# Patient Record
Sex: MALE | Race: NATIVE HAWAIIAN OR OTHER PACIFIC ISLANDER | Employment: FULL TIME | URBAN - METROPOLITAN AREA
[De-identification: names, ages, dates, MRNs, and addresses within clinical notes are randomized per-mention and may not be internally consistent; named-entity substitution may affect disease eponyms.]

---

## 2019-12-02 ENCOUNTER — HOSPITAL ENCOUNTER (EMERGENCY)
Facility: CLINIC | Age: 54
Discharge: HOME | End: 2019-12-02
Attending: EMERGENCY MEDICINE | Admitting: EMERGENCY MEDICINE
Payer: OTHER MISCELLANEOUS

## 2019-12-02 VITALS
TEMPERATURE: 96.7 F | DIASTOLIC BLOOD PRESSURE: 102 MMHG | OXYGEN SATURATION: 96 % | HEART RATE: 68 BPM | WEIGHT: 260 LBS | SYSTOLIC BLOOD PRESSURE: 182 MMHG | RESPIRATION RATE: 18 BRPM

## 2019-12-02 DIAGNOSIS — H57.8A9 SENSATION OF FOREIGN BODY IN EYE: ICD-10-CM

## 2019-12-02 PROCEDURE — 99282 EMERGENCY DEPT VISIT SF MDM: CPT | Performed by: EMERGENCY MEDICINE

## 2019-12-02 PROCEDURE — 99282 EMERGENCY DEPT VISIT SF MDM: CPT | Mod: Z6 | Performed by: EMERGENCY MEDICINE

## 2019-12-02 RX ORDER — PROPARACAINE HYDROCHLORIDE 5 MG/ML
1 SOLUTION/ DROPS OPHTHALMIC ONCE
Status: DISCONTINUED | OUTPATIENT
Start: 2019-12-02 | End: 2019-12-02 | Stop reason: HOSPADM

## 2019-12-02 SDOH — HEALTH STABILITY: MENTAL HEALTH: HOW OFTEN DO YOU HAVE A DRINK CONTAINING ALCOHOL?: NEVER

## 2019-12-02 ASSESSMENT — ENCOUNTER SYMPTOMS
EYE PAIN: 1
EYE REDNESS: 1

## 2019-12-02 NOTE — ED AVS SNAPSHOT
[facility], [place], Emergency Department  [address]  [facility] [[email or web address] [address]  Phone:  [phone]  Fax:  [phone]                                    Nico Coello   MRN: [id number]    Department:  [facility], Emergency Department   Date of Visit:  12/2/2019           After Visit Summary Signature Page    I have received my discharge instructions, and my questions have been answered. I have discussed any challenges I see with this plan with the nurse or doctor.    ..........................................................................................................................................  Patient/Patient Representative Signature      ..........................................................................................................................................  Patient Representative Print Name and Relationship to Patient    ..................................................               ................................................  Date                                   Time    ..........................................................................................................................................  Reviewed by Signature/Title    ...................................................              ..............................................  Date                                               Time          22EPIC Rev 08/18

## 2019-12-02 NOTE — ED PROVIDER NOTES
"    [facility] EMERGENCY DEPARTMENT ([facility])    12/02/19        History     Chief Complaint   Patient presents with     Eye Problem     Onset last night at work, \"I was cleaning and something got in my eye and it still hurts today,\" pain and redness in right eye.     The history is provided by the patient and medical records.   Eye Problem   Associated symptoms: redness      Nico Coello is a 53 year old male with no past significant medical history who presents to the Emergency Department for evaluation of foreign body in right eye. 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 Initially, patient believed that it was this may be a dust particle so he proceeded to attempt to wash out his eye per recommendation by his supervisor. He notes that his pain was improved after washing. Today, patient states that he went to work and still feels irritation and a sensation that the object was still present in his eye.     Patient denies knowledge of any medication allergies. He denies a history of diabetes mellitus or hypertension diagnosis but was told by his primary care provider that he is in the border.    I have reviewed the Medications, Allergies, Past Medical and Surgical History, and Social History in the Epic system.    History reviewed. No pertinent past medical history.    History reviewed. No pertinent surgical history.    History reviewed. No pertinent family history.    Social History     Tobacco Use     Smoking status: Never Smoker     Smokeless tobacco: Never Used   Substance Use Topics     Alcohol use: Never     Frequency: Never         Review of Systems   Constitutional: Negative for fever.   Eyes: Positive for pain (right) and redness.   Allergic/Immunologic: Negative for immunocompromised state.   Hematological: Does not bruise/bleed easily. "       Physical Exam   BP: (!) 199/112  Pulse: 68  Heart Rate: 68  Temp: 96.7  F (35.9  C)  Resp: 18  Weight: 117.9 kg (260 lb)  SpO2: 100 %      Physical Exam  Gen:A&Ox3, no acute distress  HEENT: no facial tenderness or wounds, head atraumatic, oropharynx clear, mucous membranes moist, TMs clear bilaterally.  Neck:no bony tenderness or step offs, no JVD, trachea midline  CV:RRR without murmurs  PULM:Clear to auscultation bilaterally  Neuro:CN II-XII intact, strength 5/5 throughout, gait stable.   Skin: no rashes or ecchymoses  Eyes: EOMI. PERRL, pupil shape is round. Right upper lid with mild blepharitis and edema at the lateral canthus. No foreign bodies noted in the eye on lid eversion. Minimal right eye conjunctival injection, tearing and photophobia. Anterior chamber deep and quiet, free of hyphema or hypopion. No fluorescene uptake by the cornea.  Visual fields intact on confrontation.Foreign body sensation improved with proparacaine.         ED Course   4:45 PM  The patient was seen and examined by Savannah Downey MD in Room ED06.        Procedures             Critical Care time:  none             Labs Ordered and Resulted from Time of ED Arrival Up to the Time of Departure from the ED - No data to display         Assessments & Plan (with Medical Decision Making)     52 yo M presenting with right eye discomfort and foreign body sensation. Triggered by getting dust in the right eye. Has irrigated with water prior to arrival.   Visual acuity normal bilaterally.   Exam without signs of infection, iritis, angle closure, or retained foreign bodies. Cornea without abrasion.   Treating supportively with cold compresses, lid cleansing with tear-free shampoo, artificial tears as needed.   Follow up with Ophthalmology if not improving as expected.   Discharged.     I have reviewed the nursing notes.    I have reviewed the findings, diagnosis, plan and need for follow up with the patient.    There are no discharge  medications for this patient.      Final diagnoses:   Sensation of foreign body in eye     I, Tani Patterson, am serving as a trained medical scribe to document services personally performed by Savannah Downey MD, based on the provider's statements to me.      ISavannah MD, was physically present and have reviewed and verified the accuracy of this note documented by Tani Patterson.     12/2/2019   [facility], [place], EMERGENCY DEPARTMENT    MD ASHLEY Busby Katrina Anne, MD  12/04/19 0358

## 2019-12-02 NOTE — DISCHARGE INSTRUCTIONS
Thank you for coming to the [facility] Emergency Department.     Please follow up with your primary care doctor in the next week for a recheck of your blood pressure. It was high today, likely due to anxiety or pain, but needs to be rechecked.     Home care instructions for your right eye:  Please buy over-the-counter 'tear-free' baby shampoo. Form a foam with this shampoo and water, then use it to clean the eyelid edges while your eye is closed. Rinse the lid with water. Do not put water or soap in your eye. Wash the eye lids once daily for the next week.   Please buy preservative-free artificial tears drops at the pharmacy. Apply 1-2 drops in the irritated eye every 1-2 hours.   Apply a cool compress to the right eye as needed for swelling or lid discomfort.    Please make an appointment to follow up with Eye Clinic (phone: [phone]) in 5-7 days unless symptoms completely resolve.

## 2019-12-04 ASSESSMENT — ENCOUNTER SYMPTOMS
BRUISES/BLEEDS EASILY: 0
FEVER: 0

## 2021-03-24 ENCOUNTER — HOSPITAL ENCOUNTER (INPATIENT)
Facility: CLINIC | Age: 56
LOS: 4 days | Discharge: HOME | End: 2021-03-29
Attending: EMERGENCY MEDICINE | Admitting: SURGERY
Payer: COMMERCIAL

## 2021-03-24 ENCOUNTER — APPOINTMENT (OUTPATIENT)
Dept: GENERAL RADIOLOGY | Facility: CLINIC | Age: 56
End: 2021-03-24
Attending: EMERGENCY MEDICINE
Payer: COMMERCIAL

## 2021-03-24 ENCOUNTER — ANCILLARY PROCEDURE (OUTPATIENT)
Dept: ULTRASOUND IMAGING | Facility: CLINIC | Age: 56
End: 2021-03-24
Attending: EMERGENCY MEDICINE
Payer: COMMERCIAL

## 2021-03-24 ENCOUNTER — APPOINTMENT (OUTPATIENT)
Dept: CT IMAGING | Facility: CLINIC | Age: 56
End: 2021-03-24
Attending: EMERGENCY MEDICINE
Payer: COMMERCIAL

## 2021-03-24 DIAGNOSIS — Z11.52 ENCOUNTER FOR SCREENING LABORATORY TESTING FOR SEVERE ACUTE RESPIRATORY SYNDROME CORONAVIRUS 2 (SARS-COV-2): ICD-10-CM

## 2021-03-24 DIAGNOSIS — K81.0 ACUTE CHOLECYSTITIS: ICD-10-CM

## 2021-03-24 LAB
ALBUMIN SERPL-MCNC: 3.4 G/DL (ref 3.4–5)
ALP SERPL-CCNC: 119 U/L (ref 40–150)
ALT SERPL W P-5'-P-CCNC: 27 U/L (ref 0–70)
ANION GAP SERPL CALCULATED.3IONS-SCNC: 8 MMOL/L (ref 3–14)
AST SERPL W P-5'-P-CCNC: 36 U/L (ref 0–45)
BASE EXCESS BLDV CALC-SCNC: 0.1 MMOL/L
BASOPHILS # BLD AUTO: 0.1 10E9/L (ref 0–0.2)
BASOPHILS NFR BLD AUTO: 0.2 %
BILIRUB SERPL-MCNC: 1.3 MG/DL (ref 0.2–1.3)
BUN SERPL-MCNC: 30 MG/DL (ref 7–30)
CALCIUM SERPL-MCNC: 9.1 MG/DL (ref 8.5–10.1)
CHLORIDE SERPL-SCNC: 103 MMOL/L (ref 94–109)
CO2 SERPL-SCNC: 25 MMOL/L (ref 20–32)
CREAT SERPL-MCNC: 2.31 MG/DL (ref 0.66–1.25)
DIFFERENTIAL METHOD BLD: ABNORMAL
EOSINOPHIL # BLD AUTO: 0 10E9/L (ref 0–0.7)
EOSINOPHIL NFR BLD AUTO: 0.1 %
ERYTHROCYTE [DISTWIDTH] IN BLOOD BY AUTOMATED COUNT: 17.6 % (ref 10–15)
GFR SERPL CREATININE-BSD FRML MDRD: 30 ML/MIN/{1.73_M2}
GLUCOSE SERPL-MCNC: 105 MG/DL (ref 70–99)
HCO3 BLDV-SCNC: 25 MMOL/L (ref 21–28)
HCT VFR BLD AUTO: 34.4 % (ref 40–53)
HGB BLD-MCNC: 10.8 G/DL (ref 13.3–17.7)
IMM GRANULOCYTES # BLD: 0.2 10E9/L (ref 0–0.4)
IMM GRANULOCYTES NFR BLD: 0.7 %
LACTATE BLD-SCNC: 1.9 MMOL/L (ref 0.7–2)
LYMPHOCYTES # BLD AUTO: 0.7 10E9/L (ref 0.8–5.3)
LYMPHOCYTES NFR BLD AUTO: 2.4 %
MCH RBC QN AUTO: 26.7 PG (ref 26.5–33)
MCHC RBC AUTO-ENTMCNC: 31.4 G/DL (ref 31.5–36.5)
MCV RBC AUTO: 85 FL (ref 78–100)
MONOCYTES # BLD AUTO: 0.8 10E9/L (ref 0–1.3)
MONOCYTES NFR BLD AUTO: 2.9 %
NEUTROPHILS # BLD AUTO: 25.1 10E9/L (ref 1.6–8.3)
NEUTROPHILS NFR BLD AUTO: 93.7 %
NRBC # BLD AUTO: 0 10*3/UL
NRBC BLD AUTO-RTO: 0 /100
O2/TOTAL GAS SETTING VFR VENT: 21 %
OXYHGB MFR BLDV: 67 %
PCO2 BLDV: 41 MM HG (ref 40–50)
PH BLDV: 7.4 PH (ref 7.32–7.43)
PLATELET # BLD AUTO: 223 10E9/L (ref 150–450)
PO2 BLDV: 38 MM HG (ref 25–47)
POTASSIUM SERPL-SCNC: 3.7 MMOL/L (ref 3.4–5.3)
PROT SERPL-MCNC: 8.5 G/DL (ref 6.8–8.8)
RBC # BLD AUTO: 4.05 10E12/L (ref 4.4–5.9)
SODIUM SERPL-SCNC: 136 MMOL/L (ref 133–144)
WBC # BLD AUTO: 26.8 10E9/L (ref 4–11)

## 2021-03-24 PROCEDURE — 71046 X-RAY EXAM CHEST 2 VIEWS: CPT | Mod: 26 | Performed by: RADIOLOGY

## 2021-03-24 PROCEDURE — 87636 SARSCOV2 & INF A&B AMP PRB: CPT | Performed by: EMERGENCY MEDICINE

## 2021-03-24 PROCEDURE — 76705 ECHO EXAM OF ABDOMEN: CPT | Mod: 26 | Performed by: EMERGENCY MEDICINE

## 2021-03-24 PROCEDURE — 71046 X-RAY EXAM CHEST 2 VIEWS: CPT

## 2021-03-24 PROCEDURE — 74176 CT ABD & PELVIS W/O CONTRAST: CPT | Mod: 26 | Performed by: RADIOLOGY

## 2021-03-24 PROCEDURE — 74176 CT ABD & PELVIS W/O CONTRAST: CPT

## 2021-03-24 PROCEDURE — 99285 EMERGENCY DEPT VISIT HI MDM: CPT | Mod: 25 | Performed by: EMERGENCY MEDICINE

## 2021-03-24 PROCEDURE — 93005 ELECTROCARDIOGRAM TRACING: CPT | Mod: 59 | Performed by: EMERGENCY MEDICINE

## 2021-03-24 PROCEDURE — 258N000003 HC RX IP 258 OP 636: Performed by: EMERGENCY MEDICINE

## 2021-03-24 PROCEDURE — 76705 ECHO EXAM OF ABDOMEN: CPT | Performed by: EMERGENCY MEDICINE

## 2021-03-24 PROCEDURE — 96366 THER/PROPH/DIAG IV INF ADDON: CPT | Performed by: EMERGENCY MEDICINE

## 2021-03-24 PROCEDURE — 87077 CULTURE AEROBIC IDENTIFY: CPT | Performed by: EMERGENCY MEDICINE

## 2021-03-24 PROCEDURE — 85025 COMPLETE CBC W/AUTO DIFF WBC: CPT | Performed by: EMERGENCY MEDICINE

## 2021-03-24 PROCEDURE — 82805 BLOOD GASES W/O2 SATURATION: CPT | Performed by: EMERGENCY MEDICINE

## 2021-03-24 PROCEDURE — 87800 DETECT AGNT MULT DNA DIREC: CPT | Performed by: EMERGENCY MEDICINE

## 2021-03-24 PROCEDURE — 83605 ASSAY OF LACTIC ACID: CPT | Performed by: EMERGENCY MEDICINE

## 2021-03-24 PROCEDURE — 250N000011 HC RX IP 250 OP 636: Performed by: EMERGENCY MEDICINE

## 2021-03-24 PROCEDURE — 87040 BLOOD CULTURE FOR BACTERIA: CPT | Performed by: EMERGENCY MEDICINE

## 2021-03-24 PROCEDURE — 96361 HYDRATE IV INFUSION ADD-ON: CPT | Performed by: EMERGENCY MEDICINE

## 2021-03-24 PROCEDURE — 96365 THER/PROPH/DIAG IV INF INIT: CPT | Performed by: EMERGENCY MEDICINE

## 2021-03-24 PROCEDURE — 93010 ELECTROCARDIOGRAM REPORT: CPT | Mod: 59 | Performed by: EMERGENCY MEDICINE

## 2021-03-24 PROCEDURE — 87186 SC STD MICRODIL/AGAR DIL: CPT | Performed by: EMERGENCY MEDICINE

## 2021-03-24 PROCEDURE — C9803 HOPD COVID-19 SPEC COLLECT: HCPCS | Performed by: EMERGENCY MEDICINE

## 2021-03-24 PROCEDURE — 80053 COMPREHEN METABOLIC PANEL: CPT | Performed by: EMERGENCY MEDICINE

## 2021-03-24 RX ORDER — AMLODIPINE AND BENAZEPRIL HYDROCHLORIDE 10; 40 MG/1; MG/1
1 CAPSULE ORAL DAILY
COMMUNITY

## 2021-03-24 RX ORDER — PIPERACILLIN SODIUM, TAZOBACTAM SODIUM 3; .375 G/15ML; G/15ML
3.38 INJECTION, POWDER, LYOPHILIZED, FOR SOLUTION INTRAVENOUS ONCE
Status: COMPLETED | OUTPATIENT
Start: 2021-03-24 | End: 2021-03-25

## 2021-03-24 RX ORDER — ATORVASTATIN CALCIUM 40 MG/1
40 TABLET, FILM COATED ORAL DAILY
COMMUNITY

## 2021-03-24 RX ADMIN — SODIUM CHLORIDE 1000 ML: 9 INJECTION, SOLUTION INTRAVENOUS at 22:54

## 2021-03-24 RX ADMIN — PIPERACILLIN SODIUM AND TAZOBACTAM SODIUM 3.38 G: 3; .375 INJECTION, POWDER, LYOPHILIZED, FOR SOLUTION INTRAVENOUS at 23:42

## 2021-03-24 RX ADMIN — SODIUM CHLORIDE 1000 ML: 9 INJECTION, SOLUTION INTRAVENOUS at 21:46

## 2021-03-24 ASSESSMENT — ENCOUNTER SYMPTOMS
DIZZINESS: 1
CHILLS: 1
FEVER: 1
BACK PAIN: 1
DYSURIA: 0
COUGH: 0
ABDOMINAL PAIN: 0
SORE THROAT: 0

## 2021-03-24 NOTE — LETTER
[facility] [address] [address]  Phone: [phone]    March 29, 2021        Nico Coello  [address] [place] [address] [address]  [place] [address]          To whom it may concern:    RE: Nico ARIZMENDI Mode    Mr Coello was hospitalized on service from 3/24/21 to 3/29/21.   He will need to be out of work for 1 week from the day of discharge (until April 4th 2021)    Please contact me for questions or concerns.      Sincerely,    Obey Valiente MD  General Surgery Resident  [phone]

## 2021-03-25 ENCOUNTER — APPOINTMENT (OUTPATIENT)
Dept: CT IMAGING | Facility: CLINIC | Age: 56
End: 2021-03-25
Payer: COMMERCIAL

## 2021-03-25 ENCOUNTER — APPOINTMENT (OUTPATIENT)
Dept: GENERAL RADIOLOGY | Facility: CLINIC | Age: 56
End: 2021-03-25
Payer: COMMERCIAL

## 2021-03-25 ENCOUNTER — DOCUMENTATION ONLY (OUTPATIENT)
Dept: CARE COORDINATION | Facility: CLINIC | Age: 56
End: 2021-03-25

## 2021-03-25 ENCOUNTER — ANESTHESIA EVENT (OUTPATIENT)
Dept: SURGERY | Facility: CLINIC | Age: 56
End: 2021-03-25
Payer: COMMERCIAL

## 2021-03-25 ENCOUNTER — APPOINTMENT (OUTPATIENT)
Dept: CT IMAGING | Facility: CLINIC | Age: 56
End: 2021-03-25
Attending: ANESTHESIOLOGY
Payer: COMMERCIAL

## 2021-03-25 ENCOUNTER — ANESTHESIA (OUTPATIENT)
Dept: MEDSURG UNIT | Facility: CLINIC | Age: 56
End: 2021-03-25
Payer: COMMERCIAL

## 2021-03-25 ENCOUNTER — ANESTHESIA EVENT (OUTPATIENT)
Dept: MEDSURG UNIT | Facility: CLINIC | Age: 56
End: 2021-03-25
Payer: COMMERCIAL

## 2021-03-25 ENCOUNTER — APPOINTMENT (OUTPATIENT)
Dept: ULTRASOUND IMAGING | Facility: CLINIC | Age: 56
End: 2021-03-25
Attending: EMERGENCY MEDICINE
Payer: COMMERCIAL

## 2021-03-25 ENCOUNTER — ANESTHESIA (OUTPATIENT)
Dept: SURGERY | Facility: CLINIC | Age: 56
End: 2021-03-25
Payer: COMMERCIAL

## 2021-03-25 PROBLEM — K81.0 ACUTE CHOLECYSTITIS: Status: ACTIVE | Noted: 2021-03-25

## 2021-03-25 LAB
ALBUMIN SERPL-MCNC: 2.9 G/DL (ref 3.4–5)
ALBUMIN UR-MCNC: 20 MG/DL
ALP SERPL-CCNC: 125 U/L (ref 40–150)
ALT SERPL W P-5'-P-CCNC: 24 U/L (ref 0–70)
ANION GAP SERPL CALCULATED.3IONS-SCNC: 7 MMOL/L (ref 3–14)
ANION GAP SERPL CALCULATED.3IONS-SCNC: 8 MMOL/L (ref 3–14)
APPEARANCE UR: CLEAR
AST SERPL W P-5'-P-CCNC: 37 U/L (ref 0–45)
BASE DEFICIT BLDA-SCNC: 4 MMOL/L
BILIRUB SERPL-MCNC: 1.4 MG/DL (ref 0.2–1.3)
BILIRUB UR QL STRIP: NEGATIVE
BUN SERPL-MCNC: 33 MG/DL (ref 7–30)
BUN SERPL-MCNC: 35 MG/DL (ref 7–30)
CALCIUM SERPL-MCNC: 8.3 MG/DL (ref 8.5–10.1)
CALCIUM SERPL-MCNC: 8.5 MG/DL (ref 8.5–10.1)
CHLORIDE SERPL-SCNC: 108 MMOL/L (ref 94–109)
CHLORIDE SERPL-SCNC: 109 MMOL/L (ref 94–109)
CO2 SERPL-SCNC: 22 MMOL/L (ref 20–32)
CO2 SERPL-SCNC: 23 MMOL/L (ref 20–32)
COLOR UR AUTO: YELLOW
CREAT SERPL-MCNC: 1.88 MG/DL (ref 0.66–1.25)
CREAT SERPL-MCNC: 1.92 MG/DL (ref 0.66–1.25)
ERYTHROCYTE [DISTWIDTH] IN BLOOD BY AUTOMATED COUNT: 17.8 % (ref 10–15)
ERYTHROCYTE [DISTWIDTH] IN BLOOD BY AUTOMATED COUNT: 18.2 % (ref 10–15)
FLUAV RNA RESP QL NAA+PROBE: NEGATIVE
FLUBV RNA RESP QL NAA+PROBE: NEGATIVE
GFR SERPL CREATININE-BSD FRML MDRD: 38 ML/MIN/{1.73_M2}
GFR SERPL CREATININE-BSD FRML MDRD: 39 ML/MIN/{1.73_M2}
GLUCOSE BLDC GLUCOMTR-MCNC: 100 MG/DL (ref 70–99)
GLUCOSE BLDC GLUCOMTR-MCNC: 112 MG/DL (ref 70–99)
GLUCOSE BLDC GLUCOMTR-MCNC: 115 MG/DL (ref 70–99)
GLUCOSE SERPL-MCNC: 122 MG/DL (ref 70–99)
GLUCOSE SERPL-MCNC: 86 MG/DL (ref 70–99)
GLUCOSE UR STRIP-MCNC: NEGATIVE MG/DL
GRAM STN SPEC: ABNORMAL
HBA1C MFR BLD: 5.9 % (ref 0–5.6)
HCO3 BLD-SCNC: 22 MMOL/L (ref 21–28)
HCT VFR BLD AUTO: 32.8 % (ref 40–53)
HCT VFR BLD AUTO: 32.8 % (ref 40–53)
HGB BLD-MCNC: 10.3 G/DL (ref 13.3–17.7)
HGB BLD-MCNC: 9.8 G/DL (ref 13.3–17.7)
HGB UR QL STRIP: ABNORMAL
INTERPRETATION ECG - MUSE: NORMAL
INTERPRETATION ECG - MUSE: NORMAL
KETONES UR STRIP-MCNC: NEGATIVE MG/DL
LABORATORY COMMENT REPORT: NORMAL
LACTATE BLD-SCNC: 1.8 MMOL/L (ref 0.7–2)
LEUKOCYTE ESTERASE UR QL STRIP: NEGATIVE
LIPASE SERPL-CCNC: 75 U/L (ref 73–393)
MCH RBC QN AUTO: 26.8 PG (ref 26.5–33)
MCH RBC QN AUTO: 27.7 PG (ref 26.5–33)
MCHC RBC AUTO-ENTMCNC: 29.9 G/DL (ref 31.5–36.5)
MCHC RBC AUTO-ENTMCNC: 31.4 G/DL (ref 31.5–36.5)
MCV RBC AUTO: 88 FL (ref 78–100)
MCV RBC AUTO: 90 FL (ref 78–100)
MUCOUS THREADS #/AREA URNS LPF: PRESENT /LPF
NITRATE UR QL: NEGATIVE
O2/TOTAL GAS SETTING VFR VENT: 50 %
PCO2 BLD: 44 MM HG (ref 35–45)
PH BLD: 7.31 PH (ref 7.35–7.45)
PH UR STRIP: 5 PH (ref 5–7)
PLATELET # BLD AUTO: 160 10E9/L (ref 150–450)
PLATELET # BLD AUTO: 176 10E9/L (ref 150–450)
PO2 BLD: 134 MM HG (ref 80–105)
POTASSIUM SERPL-SCNC: 3.6 MMOL/L (ref 3.4–5.3)
POTASSIUM SERPL-SCNC: 4.1 MMOL/L (ref 3.4–5.3)
PROT SERPL-MCNC: 7.7 G/DL (ref 6.8–8.8)
RADIOLOGIST FLAGS: ABNORMAL
RBC # BLD AUTO: 3.66 10E12/L (ref 4.4–5.9)
RBC # BLD AUTO: 3.72 10E12/L (ref 4.4–5.9)
RBC #/AREA URNS AUTO: 4 /HPF (ref 0–2)
RSV RNA SPEC QL NAA+PROBE: NEGATIVE
SARS-COV-2 RNA RESP QL NAA+PROBE: NEGATIVE
SODIUM SERPL-SCNC: 138 MMOL/L (ref 133–144)
SODIUM SERPL-SCNC: 139 MMOL/L (ref 133–144)
SOURCE: ABNORMAL
SP GR UR STRIP: 1.01 (ref 1–1.03)
SPECIMEN SOURCE: ABNORMAL
SPECIMEN SOURCE: ABNORMAL
SPECIMEN SOURCE: NORMAL
SQUAMOUS #/AREA URNS AUTO: 0 /HPF (ref 0–1)
TRANS CELLS #/AREA URNS HPF: <1 /HPF (ref 0–1)
UROBILINOGEN UR STRIP-MCNC: NORMAL MG/DL (ref 0–2)
WBC # BLD AUTO: 18.3 10E9/L (ref 4–11)
WBC # BLD AUTO: 24.2 10E9/L (ref 4–11)
WBC #/AREA URNS AUTO: 3 /HPF (ref 0–5)

## 2021-03-25 PROCEDURE — 85027 COMPLETE CBC AUTOMATED: CPT | Performed by: STUDENT IN AN ORGANIZED HEALTH CARE EDUCATION/TRAINING PROGRAM

## 2021-03-25 PROCEDURE — 250N000011 HC RX IP 250 OP 636: Performed by: ANESTHESIOLOGY

## 2021-03-25 PROCEDURE — 87205 SMEAR GRAM STAIN: CPT | Performed by: SURGERY

## 2021-03-25 PROCEDURE — 0WQF4ZZ REPAIR ABDOMINAL WALL, PERCUTANEOUS ENDOSCOPIC APPROACH: ICD-10-PCS | Performed by: SURGERY

## 2021-03-25 PROCEDURE — 250N000011 HC RX IP 250 OP 636

## 2021-03-25 PROCEDURE — 03H533Z INSERTION OF INFUSION DEVICE INTO RIGHT AXILLARY ARTERY, PERCUTANEOUS APPROACH: ICD-10-PCS | Performed by: SURGERY

## 2021-03-25 PROCEDURE — 5A1935Z RESPIRATORY VENTILATION, LESS THAN 24 CONSECUTIVE HOURS: ICD-10-PCS | Performed by: SURGERY

## 2021-03-25 PROCEDURE — 88304 TISSUE EXAM BY PATHOLOGIST: CPT | Mod: 26 | Performed by: PATHOLOGY

## 2021-03-25 PROCEDURE — 76705 ECHO EXAM OF ABDOMEN: CPT | Mod: 26 | Performed by: RADIOLOGY

## 2021-03-25 PROCEDURE — 36415 COLL VENOUS BLD VENIPUNCTURE: CPT | Performed by: STUDENT IN AN ORGANIZED HEALTH CARE EDUCATION/TRAINING PROGRAM

## 2021-03-25 PROCEDURE — 70450 CT HEAD/BRAIN W/O DYE: CPT | Mod: 26 | Performed by: RADIOLOGY

## 2021-03-25 PROCEDURE — 80048 BASIC METABOLIC PNL TOTAL CA: CPT | Performed by: ANESTHESIOLOGY

## 2021-03-25 PROCEDURE — 82803 BLOOD GASES ANY COMBINATION: CPT | Performed by: STUDENT IN AN ORGANIZED HEALTH CARE EDUCATION/TRAINING PROGRAM

## 2021-03-25 PROCEDURE — 71275 CT ANGIOGRAPHY CHEST: CPT

## 2021-03-25 PROCEDURE — 87102 FUNGUS ISOLATION CULTURE: CPT | Performed by: SURGERY

## 2021-03-25 PROCEDURE — 999N000015 HC STATISTIC ARTERIAL MONITORING DAILY

## 2021-03-25 PROCEDURE — 93005 ELECTROCARDIOGRAM TRACING: CPT

## 2021-03-25 PROCEDURE — 36620 INSERTION CATHETER ARTERY: CPT | Performed by: SURGERY

## 2021-03-25 PROCEDURE — 250N000009 HC RX 250: Performed by: ANESTHESIOLOGY

## 2021-03-25 PROCEDURE — 999N000157 HC STATISTIC RCP TIME EA 10 MIN

## 2021-03-25 PROCEDURE — 76705 ECHO EXAM OF ABDOMEN: CPT

## 2021-03-25 PROCEDURE — 200N000002 HC R&B ICU UMMC

## 2021-03-25 PROCEDURE — 250N000011 HC RX IP 250 OP 636: Performed by: SURGERY

## 2021-03-25 PROCEDURE — 71275 CT ANGIOGRAPHY CHEST: CPT | Mod: 26 | Performed by: RADIOLOGY

## 2021-03-25 PROCEDURE — 999N000185 HC STATISTIC TRANSPORT TIME EA 15 MIN

## 2021-03-25 PROCEDURE — 250N000011 HC RX IP 250 OP 636: Performed by: STUDENT IN AN ORGANIZED HEALTH CARE EDUCATION/TRAINING PROGRAM

## 2021-03-25 PROCEDURE — 370N000017 HC ANESTHESIA TECHNICAL FEE, PER MIN: Performed by: SURGERY

## 2021-03-25 PROCEDURE — 999N001017 HC STATISTIC GLUCOSE BY METER IP

## 2021-03-25 PROCEDURE — 250N000011 HC RX IP 250 OP 636: Performed by: NURSE ANESTHETIST, CERTIFIED REGISTERED

## 2021-03-25 PROCEDURE — 87086 URINE CULTURE/COLONY COUNT: CPT | Performed by: SURGERY

## 2021-03-25 PROCEDURE — 85027 COMPLETE CBC AUTOMATED: CPT | Performed by: ANESTHESIOLOGY

## 2021-03-25 PROCEDURE — 83036 HEMOGLOBIN GLYCOSYLATED A1C: CPT | Performed by: STUDENT IN AN ORGANIZED HEALTH CARE EDUCATION/TRAINING PROGRAM

## 2021-03-25 PROCEDURE — 87186 SC STD MICRODIL/AGAR DIL: CPT | Performed by: SURGERY

## 2021-03-25 PROCEDURE — 250N000013 HC RX MED GY IP 250 OP 250 PS 637

## 2021-03-25 PROCEDURE — 999N000065 XR CHEST PORT 1 VW

## 2021-03-25 PROCEDURE — 83690 ASSAY OF LIPASE: CPT | Performed by: STUDENT IN AN ORGANIZED HEALTH CARE EDUCATION/TRAINING PROGRAM

## 2021-03-25 PROCEDURE — 272N000001 HC OR GENERAL SUPPLY STERILE: Performed by: SURGERY

## 2021-03-25 PROCEDURE — 02H633Z INSERTION OF INFUSION DEVICE INTO RIGHT ATRIUM, PERCUTANEOUS APPROACH: ICD-10-PCS | Performed by: SURGERY

## 2021-03-25 PROCEDURE — 80053 COMPREHEN METABOLIC PANEL: CPT | Performed by: STUDENT IN AN ORGANIZED HEALTH CARE EDUCATION/TRAINING PROGRAM

## 2021-03-25 PROCEDURE — 71045 X-RAY EXAM CHEST 1 VIEW: CPT | Mod: 26 | Performed by: RADIOLOGY

## 2021-03-25 PROCEDURE — 74177 CT ABD & PELVIS W/CONTRAST: CPT

## 2021-03-25 PROCEDURE — 258N000003 HC RX IP 258 OP 636: Performed by: SURGERY

## 2021-03-25 PROCEDURE — 87070 CULTURE OTHR SPECIMN AEROBIC: CPT | Performed by: SURGERY

## 2021-03-25 PROCEDURE — 360N000083 HC SURGERY LEVEL 3 W/ FLUORO, PER MIN: Performed by: SURGERY

## 2021-03-25 PROCEDURE — 99222 1ST HOSP IP/OBS MODERATE 55: CPT | Mod: AI | Performed by: SURGERY

## 2021-03-25 PROCEDURE — 999N000141 HC STATISTIC PRE-PROCEDURE NURSING ASSESSMENT: Performed by: SURGERY

## 2021-03-25 PROCEDURE — 0FB44ZZ EXCISION OF GALLBLADDER, PERCUTANEOUS ENDOSCOPIC APPROACH: ICD-10-PCS | Performed by: SURGERY

## 2021-03-25 PROCEDURE — 87075 CULTR BACTERIA EXCEPT BLOOD: CPT | Performed by: SURGERY

## 2021-03-25 PROCEDURE — 272N000010 HC KIT CATH ARTERIAL EXT SUPPLY

## 2021-03-25 PROCEDURE — 250N000009 HC RX 250: Performed by: NURSE ANESTHETIST, CERTIFIED REGISTERED

## 2021-03-25 PROCEDURE — 80053 COMPREHEN METABOLIC PANEL: CPT | Performed by: SURGERY

## 2021-03-25 PROCEDURE — 999N000011 HC STATISTIC ANESTHESIA CASE

## 2021-03-25 PROCEDURE — 710N000010 HC RECOVERY PHASE 1, LEVEL 2, PER MIN: Performed by: SURGERY

## 2021-03-25 PROCEDURE — 87077 CULTURE AEROBIC IDENTIFY: CPT | Performed by: SURGERY

## 2021-03-25 PROCEDURE — 74177 CT ABD & PELVIS W/CONTRAST: CPT | Mod: 26 | Performed by: RADIOLOGY

## 2021-03-25 PROCEDURE — 36556 INSERT NON-TUNNEL CV CATH: CPT | Performed by: SURGERY

## 2021-03-25 PROCEDURE — 93010 ELECTROCARDIOGRAM REPORT: CPT | Performed by: INTERNAL MEDICINE

## 2021-03-25 PROCEDURE — 81001 URINALYSIS AUTO W/SCOPE: CPT | Performed by: SURGERY

## 2021-03-25 PROCEDURE — 88304 TISSUE EXAM BY PATHOLOGIST: CPT | Mod: TC | Performed by: SURGERY

## 2021-03-25 PROCEDURE — 258N000003 HC RX IP 258 OP 636: Performed by: NURSE ANESTHETIST, CERTIFIED REGISTERED

## 2021-03-25 PROCEDURE — 258N000003 HC RX IP 258 OP 636: Performed by: STUDENT IN AN ORGANIZED HEALTH CARE EDUCATION/TRAINING PROGRAM

## 2021-03-25 PROCEDURE — 70450 CT HEAD/BRAIN W/O DYE: CPT

## 2021-03-25 PROCEDURE — 250N000025 HC SEVOFLURANE, PER MIN: Performed by: SURGERY

## 2021-03-25 PROCEDURE — 83605 ASSAY OF LACTIC ACID: CPT | Performed by: STUDENT IN AN ORGANIZED HEALTH CARE EDUCATION/TRAINING PROGRAM

## 2021-03-25 RX ORDER — OXYCODONE HYDROCHLORIDE 5 MG/1
5-10 TABLET ORAL EVERY 4 HOURS PRN
Status: DISCONTINUED | OUTPATIENT
Start: 2021-03-25 | End: 2021-03-29 | Stop reason: HOSPADM

## 2021-03-25 RX ORDER — NALOXONE HYDROCHLORIDE 0.4 MG/ML
0.2 INJECTION, SOLUTION INTRAMUSCULAR; INTRAVENOUS; SUBCUTANEOUS
Status: DISCONTINUED | OUTPATIENT
Start: 2021-03-25 | End: 2021-03-25

## 2021-03-25 RX ORDER — FENTANYL CITRATE 50 UG/ML
25-50 INJECTION, SOLUTION INTRAMUSCULAR; INTRAVENOUS
Status: DISCONTINUED | OUTPATIENT
Start: 2021-03-25 | End: 2021-03-25 | Stop reason: HOSPADM

## 2021-03-25 RX ORDER — DEXAMETHASONE SODIUM PHOSPHATE 10 MG/ML
INJECTION, SOLUTION INTRAMUSCULAR; INTRAVENOUS
Status: COMPLETED | OUTPATIENT
Start: 2021-03-25 | End: 2021-03-25

## 2021-03-25 RX ORDER — FENTANYL CITRATE 50 UG/ML
INJECTION, SOLUTION INTRAMUSCULAR; INTRAVENOUS PRN
Status: DISCONTINUED | OUTPATIENT
Start: 2021-03-25 | End: 2021-03-25

## 2021-03-25 RX ORDER — SODIUM CHLORIDE, SODIUM LACTATE, POTASSIUM CHLORIDE, CALCIUM CHLORIDE 600; 310; 30; 20 MG/100ML; MG/100ML; MG/100ML; MG/100ML
INJECTION, SOLUTION INTRAVENOUS CONTINUOUS PRN
Status: DISCONTINUED | OUTPATIENT
Start: 2021-03-25 | End: 2021-03-25

## 2021-03-25 RX ORDER — ONDANSETRON 4 MG/1
4 TABLET, ORALLY DISINTEGRATING ORAL EVERY 6 HOURS PRN
Status: DISCONTINUED | OUTPATIENT
Start: 2021-03-25 | End: 2021-03-29 | Stop reason: HOSPADM

## 2021-03-25 RX ORDER — NALOXONE HYDROCHLORIDE 0.4 MG/ML
0.4 INJECTION, SOLUTION INTRAMUSCULAR; INTRAVENOUS; SUBCUTANEOUS
Status: DISCONTINUED | OUTPATIENT
Start: 2021-03-25 | End: 2021-03-29 | Stop reason: HOSPADM

## 2021-03-25 RX ORDER — MEPERIDINE HYDROCHLORIDE 25 MG/ML
12.5 INJECTION INTRAMUSCULAR; INTRAVENOUS; SUBCUTANEOUS
Status: DISCONTINUED | OUTPATIENT
Start: 2021-03-25 | End: 2021-03-25 | Stop reason: HOSPADM

## 2021-03-25 RX ORDER — PIPERACILLIN SODIUM, TAZOBACTAM SODIUM 3; .375 G/15ML; G/15ML
3.38 INJECTION, POWDER, LYOPHILIZED, FOR SOLUTION INTRAVENOUS EVERY 6 HOURS
Status: DISCONTINUED | OUTPATIENT
Start: 2021-03-25 | End: 2021-03-26

## 2021-03-25 RX ORDER — AMLODIPINE AND BENAZEPRIL HYDROCHLORIDE 10; 40 MG/1; MG/1
1 CAPSULE ORAL DAILY
Status: DISCONTINUED | OUTPATIENT
Start: 2021-03-25 | End: 2021-03-25 | Stop reason: RX

## 2021-03-25 RX ORDER — ACETAMINOPHEN 325 MG/1
650 TABLET ORAL EVERY 4 HOURS PRN
Status: DISCONTINUED | OUTPATIENT
Start: 2021-03-28 | End: 2021-03-29 | Stop reason: HOSPADM

## 2021-03-25 RX ORDER — EPHEDRINE SULFATE 50 MG/ML
INJECTION, SOLUTION INTRAMUSCULAR; INTRAVENOUS; SUBCUTANEOUS PRN
Status: DISCONTINUED | OUTPATIENT
Start: 2021-03-25 | End: 2021-03-25

## 2021-03-25 RX ORDER — SODIUM CHLORIDE, SODIUM LACTATE, POTASSIUM CHLORIDE, CALCIUM CHLORIDE 600; 310; 30; 20 MG/100ML; MG/100ML; MG/100ML; MG/100ML
INJECTION, SOLUTION INTRAVENOUS CONTINUOUS
Status: DISCONTINUED | OUTPATIENT
Start: 2021-03-25 | End: 2021-03-25 | Stop reason: HOSPADM

## 2021-03-25 RX ORDER — BISACODYL 10 MG
10 SUPPOSITORY, RECTAL RECTAL DAILY PRN
Status: DISCONTINUED | OUTPATIENT
Start: 2021-03-25 | End: 2021-03-29 | Stop reason: HOSPADM

## 2021-03-25 RX ORDER — ONDANSETRON 2 MG/ML
4 INJECTION INTRAMUSCULAR; INTRAVENOUS EVERY 6 HOURS PRN
Status: DISCONTINUED | OUTPATIENT
Start: 2021-03-25 | End: 2021-03-29 | Stop reason: HOSPADM

## 2021-03-25 RX ORDER — NICOTINE POLACRILEX 4 MG
15-30 LOZENGE BUCCAL
Status: DISCONTINUED | OUTPATIENT
Start: 2021-03-25 | End: 2021-03-29 | Stop reason: HOSPADM

## 2021-03-25 RX ORDER — HYDROMORPHONE HYDROCHLORIDE 1 MG/ML
0.4 INJECTION, SOLUTION INTRAMUSCULAR; INTRAVENOUS; SUBCUTANEOUS
Status: DISCONTINUED | OUTPATIENT
Start: 2021-03-25 | End: 2021-03-29 | Stop reason: HOSPADM

## 2021-03-25 RX ORDER — PROPOFOL 10 MG/ML
5-75 INJECTION, EMULSION INTRAVENOUS CONTINUOUS
Status: DISCONTINUED | OUTPATIENT
Start: 2021-03-25 | End: 2021-03-26

## 2021-03-25 RX ORDER — ONDANSETRON 2 MG/ML
INJECTION INTRAMUSCULAR; INTRAVENOUS PRN
Status: DISCONTINUED | OUTPATIENT
Start: 2021-03-25 | End: 2021-03-25

## 2021-03-25 RX ORDER — POLYETHYLENE GLYCOL 3350 17 G/17G
17 POWDER, FOR SOLUTION ORAL DAILY
Status: DISCONTINUED | OUTPATIENT
Start: 2021-03-26 | End: 2021-03-29 | Stop reason: HOSPADM

## 2021-03-25 RX ORDER — NALOXONE HYDROCHLORIDE 0.4 MG/ML
0.4 INJECTION, SOLUTION INTRAMUSCULAR; INTRAVENOUS; SUBCUTANEOUS
Status: DISCONTINUED | OUTPATIENT
Start: 2021-03-25 | End: 2021-03-25 | Stop reason: HOSPADM

## 2021-03-25 RX ORDER — HYDROMORPHONE HCL IN WATER/PF 6 MG/30 ML
0.2 PATIENT CONTROLLED ANALGESIA SYRINGE INTRAVENOUS
Status: DISCONTINUED | OUTPATIENT
Start: 2021-03-25 | End: 2021-03-29 | Stop reason: HOSPADM

## 2021-03-25 RX ORDER — PROPOFOL 10 MG/ML
INJECTION, EMULSION INTRAVENOUS
Status: COMPLETED | OUTPATIENT
Start: 2021-03-25 | End: 2021-03-25

## 2021-03-25 RX ORDER — NALOXONE HYDROCHLORIDE 0.4 MG/ML
0.2 INJECTION, SOLUTION INTRAMUSCULAR; INTRAVENOUS; SUBCUTANEOUS
Status: DISCONTINUED | OUTPATIENT
Start: 2021-03-25 | End: 2021-03-25 | Stop reason: HOSPADM

## 2021-03-25 RX ORDER — PIPERACILLIN SODIUM, TAZOBACTAM SODIUM 3; .375 G/15ML; G/15ML
3.38 INJECTION, POWDER, LYOPHILIZED, FOR SOLUTION INTRAVENOUS EVERY 6 HOURS
Status: DISCONTINUED | OUTPATIENT
Start: 2021-03-25 | End: 2021-03-25

## 2021-03-25 RX ORDER — FLUMAZENIL 0.1 MG/ML
0.2 INJECTION, SOLUTION INTRAVENOUS
Status: DISCONTINUED | OUTPATIENT
Start: 2021-03-25 | End: 2021-03-25 | Stop reason: HOSPADM

## 2021-03-25 RX ORDER — AMOXICILLIN 250 MG
1 CAPSULE ORAL 2 TIMES DAILY
Status: DISCONTINUED | OUTPATIENT
Start: 2021-03-25 | End: 2021-03-26

## 2021-03-25 RX ORDER — ONDANSETRON 4 MG/1
4 TABLET, ORALLY DISINTEGRATING ORAL EVERY 30 MIN PRN
Status: DISCONTINUED | OUTPATIENT
Start: 2021-03-25 | End: 2021-03-25 | Stop reason: HOSPADM

## 2021-03-25 RX ORDER — SODIUM CHLORIDE 9 MG/ML
INJECTION, SOLUTION INTRAVENOUS CONTINUOUS
Status: DISCONTINUED | OUTPATIENT
Start: 2021-03-25 | End: 2021-03-26

## 2021-03-25 RX ORDER — ACETAMINOPHEN 325 MG/1
325 TABLET ORAL EVERY 4 HOURS PRN
Status: DISCONTINUED | OUTPATIENT
Start: 2021-03-25 | End: 2021-03-29 | Stop reason: HOSPADM

## 2021-03-25 RX ORDER — ONDANSETRON 2 MG/ML
4 INJECTION INTRAMUSCULAR; INTRAVENOUS EVERY 6 HOURS PRN
Status: DISCONTINUED | OUTPATIENT
Start: 2021-03-25 | End: 2021-03-25

## 2021-03-25 RX ORDER — ONDANSETRON 4 MG/1
4 TABLET, ORALLY DISINTEGRATING ORAL EVERY 6 HOURS PRN
Status: DISCONTINUED | OUTPATIENT
Start: 2021-03-25 | End: 2021-03-25

## 2021-03-25 RX ORDER — DOCUSATE SODIUM 100 MG/1
100 CAPSULE, LIQUID FILLED ORAL 2 TIMES DAILY
Status: DISCONTINUED | OUTPATIENT
Start: 2021-03-25 | End: 2021-03-26

## 2021-03-25 RX ORDER — PHENYLEPHRINE HCL IN 0.9% NACL 50MG/250ML
0.5-6 PLASTIC BAG, INJECTION (ML) INTRAVENOUS CONTINUOUS
Status: DISCONTINUED | OUTPATIENT
Start: 2021-03-25 | End: 2021-03-25 | Stop reason: HOSPADM

## 2021-03-25 RX ORDER — DEXTROSE MONOHYDRATE 25 G/50ML
25-50 INJECTION, SOLUTION INTRAVENOUS
Status: DISCONTINUED | OUTPATIENT
Start: 2021-03-25 | End: 2021-03-29 | Stop reason: HOSPADM

## 2021-03-25 RX ORDER — NALOXONE HYDROCHLORIDE 0.4 MG/ML
0.2 INJECTION, SOLUTION INTRAMUSCULAR; INTRAVENOUS; SUBCUTANEOUS
Status: DISCONTINUED | OUTPATIENT
Start: 2021-03-25 | End: 2021-03-29 | Stop reason: HOSPADM

## 2021-03-25 RX ORDER — OXYCODONE HYDROCHLORIDE 5 MG/1
5 TABLET ORAL EVERY 4 HOURS PRN
Status: DISCONTINUED | OUTPATIENT
Start: 2021-03-25 | End: 2021-03-26

## 2021-03-25 RX ORDER — NALOXONE HYDROCHLORIDE 0.4 MG/ML
0.4 INJECTION, SOLUTION INTRAMUSCULAR; INTRAVENOUS; SUBCUTANEOUS
Status: DISCONTINUED | OUTPATIENT
Start: 2021-03-25 | End: 2021-03-25

## 2021-03-25 RX ORDER — BUPIVACAINE HYDROCHLORIDE 2.5 MG/ML
INJECTION, SOLUTION EPIDURAL; INFILTRATION; INTRACAUDAL
Status: COMPLETED | OUTPATIENT
Start: 2021-03-25 | End: 2021-03-25

## 2021-03-25 RX ORDER — OXYCODONE HYDROCHLORIDE 10 MG/1
10 TABLET ORAL EVERY 4 HOURS PRN
Status: DISCONTINUED | OUTPATIENT
Start: 2021-03-25 | End: 2021-03-25

## 2021-03-25 RX ORDER — FENTANYL CITRATE 50 UG/ML
25-50 INJECTION, SOLUTION INTRAMUSCULAR; INTRAVENOUS EVERY 5 MIN PRN
Status: DISCONTINUED | OUTPATIENT
Start: 2021-03-25 | End: 2021-03-25 | Stop reason: HOSPADM

## 2021-03-25 RX ORDER — PROCHLORPERAZINE MALEATE 5 MG
10 TABLET ORAL EVERY 6 HOURS PRN
Status: DISCONTINUED | OUTPATIENT
Start: 2021-03-25 | End: 2021-03-29 | Stop reason: HOSPADM

## 2021-03-25 RX ORDER — LIDOCAINE 40 MG/G
CREAM TOPICAL
Status: DISCONTINUED | OUTPATIENT
Start: 2021-03-25 | End: 2021-03-29 | Stop reason: HOSPADM

## 2021-03-25 RX ORDER — SODIUM CHLORIDE 9 MG/ML
INJECTION, SOLUTION INTRAVENOUS CONTINUOUS
Status: DISCONTINUED | OUTPATIENT
Start: 2021-03-25 | End: 2021-03-25

## 2021-03-25 RX ORDER — IOPAMIDOL 755 MG/ML
135 INJECTION, SOLUTION INTRAVASCULAR ONCE
Status: COMPLETED | OUTPATIENT
Start: 2021-03-25 | End: 2021-03-25

## 2021-03-25 RX ORDER — LISINOPRIL 20 MG/1
40 TABLET ORAL DAILY
Status: DISCONTINUED | OUTPATIENT
Start: 2021-03-25 | End: 2021-03-29 | Stop reason: HOSPADM

## 2021-03-25 RX ORDER — OXYCODONE HYDROCHLORIDE 5 MG/1
5 TABLET ORAL EVERY 4 HOURS PRN
Status: DISCONTINUED | OUTPATIENT
Start: 2021-03-25 | End: 2021-03-25

## 2021-03-25 RX ORDER — AMLODIPINE BESYLATE 10 MG/1
10 TABLET ORAL DAILY
Status: DISCONTINUED | OUTPATIENT
Start: 2021-03-25 | End: 2021-03-29 | Stop reason: HOSPADM

## 2021-03-25 RX ORDER — HYDROMORPHONE HYDROCHLORIDE 1 MG/ML
.3-.5 INJECTION, SOLUTION INTRAMUSCULAR; INTRAVENOUS; SUBCUTANEOUS EVERY 10 MIN PRN
Status: DISCONTINUED | OUTPATIENT
Start: 2021-03-25 | End: 2021-03-25 | Stop reason: HOSPADM

## 2021-03-25 RX ORDER — LIDOCAINE HYDROCHLORIDE 20 MG/ML
INJECTION, SOLUTION INFILTRATION; PERINEURAL PRN
Status: DISCONTINUED | OUTPATIENT
Start: 2021-03-25 | End: 2021-03-25

## 2021-03-25 RX ORDER — PROPOFOL 10 MG/ML
INJECTION, EMULSION INTRAVENOUS PRN
Status: DISCONTINUED | OUTPATIENT
Start: 2021-03-25 | End: 2021-03-25

## 2021-03-25 RX ORDER — ACETAMINOPHEN 325 MG/1
975 TABLET ORAL EVERY 8 HOURS
Status: DISPENSED | OUTPATIENT
Start: 2021-03-25 | End: 2021-03-28

## 2021-03-25 RX ORDER — ACETAMINOPHEN 325 MG/1
975 TABLET ORAL ONCE
Status: DISCONTINUED | OUTPATIENT
Start: 2021-03-25 | End: 2021-03-25 | Stop reason: HOSPADM

## 2021-03-25 RX ORDER — DEXAMETHASONE SODIUM PHOSPHATE 10 MG/ML
INJECTION, SOLUTION INTRAMUSCULAR; INTRAVENOUS PRN
Status: DISCONTINUED | OUTPATIENT
Start: 2021-03-25 | End: 2021-03-25

## 2021-03-25 RX ORDER — ONDANSETRON 2 MG/ML
4 INJECTION INTRAMUSCULAR; INTRAVENOUS EVERY 30 MIN PRN
Status: DISCONTINUED | OUTPATIENT
Start: 2021-03-25 | End: 2021-03-25 | Stop reason: HOSPADM

## 2021-03-25 RX ORDER — ALBUTEROL SULFATE 0.83 MG/ML
2.5 SOLUTION RESPIRATORY (INHALATION) EVERY 4 HOURS PRN
Status: DISCONTINUED | OUTPATIENT
Start: 2021-03-25 | End: 2021-03-25 | Stop reason: HOSPADM

## 2021-03-25 RX ADMIN — PIPERACILLIN SODIUM AND TAZOBACTAM SODIUM 3.38 G: 3; .375 INJECTION, POWDER, LYOPHILIZED, FOR SOLUTION INTRAVENOUS at 05:11

## 2021-03-25 RX ADMIN — MIDAZOLAM 2 MG: 1 INJECTION INTRAMUSCULAR; INTRAVENOUS at 08:18

## 2021-03-25 RX ADMIN — PHENYLEPHRINE HYDROCHLORIDE 100 MCG: 10 INJECTION INTRAVENOUS at 08:45

## 2021-03-25 RX ADMIN — PROPOFOL 30 MG: 10 INJECTION, EMULSION INTRAVENOUS at 11:37

## 2021-03-25 RX ADMIN — SODIUM CHLORIDE: 9 INJECTION, SOLUTION INTRAVENOUS at 04:17

## 2021-03-25 RX ADMIN — ROCURONIUM BROMIDE 10 MG: 10 INJECTION INTRAVENOUS at 09:04

## 2021-03-25 RX ADMIN — OXYCODONE HYDROCHLORIDE 5 MG: 5 TABLET ORAL at 04:17

## 2021-03-25 RX ADMIN — ROCURONIUM BROMIDE 20 MG: 10 INJECTION INTRAVENOUS at 08:54

## 2021-03-25 RX ADMIN — DEXAMETHASONE SODIUM PHOSPHATE 6 MG: 10 INJECTION, SOLUTION INTRAMUSCULAR; INTRAVENOUS at 08:45

## 2021-03-25 RX ADMIN — DEXAMETHASONE SODIUM PHOSPHATE 2 MG: 10 INJECTION, SOLUTION INTRAMUSCULAR; INTRAVENOUS at 08:57

## 2021-03-25 RX ADMIN — FENTANYL CITRATE 200 MCG: 50 INJECTION, SOLUTION INTRAMUSCULAR; INTRAVENOUS at 08:30

## 2021-03-25 RX ADMIN — LIDOCAINE HYDROCHLORIDE 100 MG: 20 INJECTION, SOLUTION INFILTRATION; PERINEURAL at 08:30

## 2021-03-25 RX ADMIN — SODIUM CHLORIDE: 9 INJECTION, SOLUTION INTRAVENOUS at 14:25

## 2021-03-25 RX ADMIN — SODIUM CHLORIDE, POTASSIUM CHLORIDE, SODIUM LACTATE AND CALCIUM CHLORIDE: 600; 310; 30; 20 INJECTION, SOLUTION INTRAVENOUS at 08:21

## 2021-03-25 RX ADMIN — PIPERACILLIN SODIUM AND TAZOBACTAM SODIUM 3.38 G: 3; .375 INJECTION, POWDER, LYOPHILIZED, FOR SOLUTION INTRAVENOUS at 20:17

## 2021-03-25 RX ADMIN — ROCURONIUM BROMIDE 40 MG: 10 INJECTION INTRAVENOUS at 11:37

## 2021-03-25 RX ADMIN — NALOXONE HYDROCHLORIDE 0.12 MG: 0.4 INJECTION, SOLUTION INTRAMUSCULAR; INTRAVENOUS; SUBCUTANEOUS at 11:45

## 2021-03-25 RX ADMIN — PHENYLEPHRINE HYDROCHLORIDE 0.5 MCG/KG/MIN: 10 INJECTION INTRAVENOUS at 08:49

## 2021-03-25 RX ADMIN — Medication 5 MG: at 08:45

## 2021-03-25 RX ADMIN — Medication 25 MCG/HR: at 14:14

## 2021-03-25 RX ADMIN — ROCURONIUM BROMIDE 70 MG: 10 INJECTION INTRAVENOUS at 08:31

## 2021-03-25 RX ADMIN — SODIUM CHLORIDE, POTASSIUM CHLORIDE, SODIUM LACTATE AND CALCIUM CHLORIDE 1000 ML: 600; 310; 30; 20 INJECTION, SOLUTION INTRAVENOUS at 16:46

## 2021-03-25 RX ADMIN — PROPOFOL 30 MCG/KG/MIN: 10 INJECTION, EMULSION INTRAVENOUS at 16:44

## 2021-03-25 RX ADMIN — Medication 10 MG: at 08:34

## 2021-03-25 RX ADMIN — PIPERACILLIN SODIUM AND TAZOBACTAM SODIUM 3.38 G: 3; .375 INJECTION, POWDER, LYOPHILIZED, FOR SOLUTION INTRAVENOUS at 14:15

## 2021-03-25 RX ADMIN — SUGAMMADEX 200 MG: 100 INJECTION, SOLUTION INTRAVENOUS at 10:33

## 2021-03-25 RX ADMIN — ROCURONIUM BROMIDE 20 MG: 10 INJECTION INTRAVENOUS at 09:38

## 2021-03-25 RX ADMIN — BUPIVACAINE HYDROCHLORIDE 60 ML: 2.5 INJECTION, SOLUTION EPIDURAL; INFILTRATION; INTRACAUDAL; PERINEURAL at 08:57

## 2021-03-25 RX ADMIN — SODIUM CHLORIDE: 9 INJECTION, SOLUTION INTRAVENOUS at 22:00

## 2021-03-25 RX ADMIN — PHENYLEPHRINE HYDROCHLORIDE 200 MCG: 10 INJECTION INTRAVENOUS at 08:39

## 2021-03-25 RX ADMIN — PROPOFOL 170 MG: 10 INJECTION, EMULSION INTRAVENOUS at 08:30

## 2021-03-25 RX ADMIN — Medication 40 MCG: at 08:57

## 2021-03-25 RX ADMIN — OXYCODONE HYDROCHLORIDE 5 MG: 5 TABLET ORAL at 05:11

## 2021-03-25 RX ADMIN — IOPAMIDOL 135 ML: 755 INJECTION, SOLUTION INTRAVENOUS at 17:05

## 2021-03-25 RX ADMIN — SODIUM CHLORIDE, POTASSIUM CHLORIDE, SODIUM LACTATE AND CALCIUM CHLORIDE 1000 ML: 600; 310; 30; 20 INJECTION, SOLUTION INTRAVENOUS at 15:30

## 2021-03-25 RX ADMIN — ONDANSETRON 4 MG: 2 INJECTION INTRAMUSCULAR; INTRAVENOUS at 11:18

## 2021-03-25 RX ADMIN — ONDANSETRON 4 MG: 2 INJECTION INTRAMUSCULAR; INTRAVENOUS at 10:22

## 2021-03-25 ASSESSMENT — ACTIVITIES OF DAILY LIVING (ADL)
TOILETING_ISSUES: NO
PATIENT_/_FAMILY_COMMUNICATION_STYLE: SPOKEN LANGUAGE (ENGLISH OR BILINGUAL)
NUMBER_OF_TIMES_PATIENT_HAS_FALLEN_WITHIN_LAST_SIX_MONTHS: 1
CONCENTRATING,_REMEMBERING_OR_MAKING_DECISIONS_DIFFICULTY: NO
WEAR_GLASSES_OR_BLIND: NO
ADLS_ACUITY_SCORE: 12
WALKING_OR_CLIMBING_STAIRS_DIFFICULTY: NO
DRESSING/BATHING_DIFFICULTY: NO
DOING_ERRANDS_INDEPENDENTLY_DIFFICULTY: NO
DIFFICULTY_EATING/SWALLOWING: NO
ADLS_ACUITY_SCORE: 17
ADLS_ACUITY_SCORE: 17
DIFFICULTY_COMMUNICATING: NO
HEARING_DIFFICULTY_OR_DEAF: NO
FALL_HISTORY_WITHIN_LAST_SIX_MONTHS: YES

## 2021-03-25 ASSESSMENT — MIFFLIN-ST. JEOR
SCORE: 1783.82
SCORE: 1787.88

## 2021-03-25 NOTE — PROCEDURES
[facility]    Central line    Date/Time: 3/25/2021 4:48 PM  Performed by: Sony Luna MD  Authorized by: Sony Luna MD   Indications: vascular access    UNIVERSAL PROTOCOL   Site Marked: NA  Prior Images Obtained and Reviewed:  NA  Required items: Required blood products, implants, devices and special equipment available    Patient identity confirmed:  Arm band, hospital-assigned identification number and anonymous protocol, patient vented/unresponsive  Patient was reevaluated immediately before administering moderate or deep sedation or anesthesia  Confirmation Checklist:  Patient's identity using two indicators, relevant allergies, procedure was appropriate and matched the consent or emergent situation and correct equipment/implants were available  Time out: Immediately prior to the procedure a time out was called    Universal Protocol: the Joint Commission Universal Protocol was followed    Preparation: Patient was prepped and draped in usual sterile fashion    ESBL (mL):  5         ANESTHESIA    Anesthesia: Local infiltration  Local Anesthetic:  Lidocaine 1% with epinephrine      SEDATION    Patient Sedated: Yes    Sedation Type:  Deep  Sedation:  Propofol  Vital signs: Vital signs monitored during sedation      Preparation: skin prepped with ChloraPrep  Skin prep agent dried: skin prep agent completely dried prior to procedure  Sterile barriers: all five maximum sterile barriers used - cap, mask, sterile gown, sterile gloves, and large sterile sheet  Hand hygiene: hand hygiene performed prior to central venous catheter insertion  Patient position: Trendelenburg  Catheter type: triple lumen  Catheter size: 7 Fr  Pre-procedure: landmarks identified  Ultrasound guidance: yes  Number of attempts: 1  Successful placement: yes  Post-procedure: line sutured and dressing applied  Assessment: blood return through all ports,  free fluid flow,  placement  verified by x-ray and no pneumothorax on x-ray    PROCEDURE   Patient Tolerance:  Patient tolerated the procedure well with no immediate complications    Length of time physician/provider present for 1:1 monitoring during sedation: 20    Dr. Person was present for the entire procedure    Sony Luna MD, PhD, PGY-2  General Surgery

## 2021-03-25 NOTE — PROCEDURES
[facility]    Arterial line placement    Date/Time: 3/25/2021 4:52 PM  Performed by: Sony Luna MD  Authorized by: Sony Luna MD     UNIVERSAL PROTOCOL   Site Marked: Yes  Prior Images Obtained and Reviewed:  Yes  Required items: Required blood products, implants, devices and special equipment available    Patient identity confirmed:  Verbally with patient  Patient was reevaluated immediately before administering moderate or deep sedation or anesthesia  Confirmation Checklist:  Relevant allergies, procedure was appropriate and matched the consent or emergent situation, correct equipment/implants were available and patient's identity using two indicators  Time out: Immediately prior to the procedure a time out was called    Universal Protocol: the Joint Commission Universal Protocol was followed    Preparation: Patient was prepped and draped in usual sterile fashion    ESBL (mL):  5      Indication: multiple ABGs respiratory failure hemodynamic monitoring  Location:        ANESTHESIA    Anesthesia: Local infiltration  Local Anesthetic:  Lidocaine 1% with epinephrine      SEDATION    Patient Sedated: Yes    Sedation Type:  Deep  Sedation:  Propofol  Vital signs: Vital signs monitored during sedation      PROCEDURE DETAILS      Seldinger technique: Seldinger technique used    Number of Attempts:  1  Post-procedure:  Line sutured and dressing applied  CMS: unchanged  PROCEDURE   Patient Tolerance:  Patient tolerated the procedure well with no immediate complications    Length of time physician/provider present for 1:1 monitoring during sedation: 20      Location: Right axillary    Dr. Person was available the entire procedure

## 2021-03-25 NOTE — ANESTHESIA PROCEDURE NOTES
TAP Procedure Note  Pre-Procedure   Staff -        Anesthesiologist:  Taiwo Ritter DO       Resident/Fellow: Nehemiah Lu III, MD       Performed By: resident       Location: pre-op       Pre-Anesthestic Checklist: patient identified, IV checked, site marked, risks and benefits discussed, informed consent, monitors and equipment checked, pre-op evaluation, at physician/surgeon's request and post-op pain management  Timeout:       Correct Patient: Yes        Correct Procedure: Yes        Correct Site: Yes        Correct Position: Yes        Correct Laterality: Yes        Site Marked: Yes  Procedure Documentation  Procedure: TAP       Diagnosis: POST OPERATIVE PAIN       Laterality: bilateral       Patient Position: supine       Patient Prep/Sterile Barriers: sterile gloves, mask       Skin prep: Chloraprep       Needle Type: short bevel       Needle Gauge: 21.        Needle Length (millimeters): 110        - Ultrasound guided       - Ultrasound used to identify targeted nerve, plexus, vascular marker, or fascial plane and place a needle adjacent to it in real-time       - Ultrasound was used to visualize the spread of anesthetic in close proximity to the above referenced structure       - A permanent image is entered into the patient's record.    Assessment/Narrative         The placement was negative for: blood aspirated, painful injection and site bleeding       Paresthesias: No.     Bolus given via needle..        Secured via.        Insertion/Infusion Method: Single Shot       Complications: none       Injection made incrementally with aspirations every 5 mL.    Medication(s) Administered   Bupivacaine 0.25% PF (Infiltration), 60 mL  Dexamethasone 10 mg/mL PF (Perineural), 2 mg  Dexmedetomidine 4 mcg/mL (Perineural), 40 mcg

## 2021-03-25 NOTE — H&P
SURGICAL ICU ADMISSION NOTE  3/25/2021      ASSESSMENT: Nico Coello is a 55 year old male POD # 0 s/p laparoscopic partial cholecystectomy for perforated cholecystitis.     PLAN:   Neuro/ pain/ sedation:  # AMS in PACU  - Propofol gtt- turn down to allow patient to wake  - Nel tylenol, fentanyl gtt  - AMS in PACU, no improvement with NARCAN  - CT head negative for acute pathology     Pulmonary care:  # Re-intubation in PACU  - Mechanically ventilated  - ABG pending  - CT PE pending  - ABG 7.31/44/134/22     Cardiovascular:  # HTN    - PTA amlodipine, lisinopril held  - Bedside TTE in PACU normal  - Fluid resuscitation with 2 L so far     GI/ Nutrition:  # S/p lap partial lila    - NPO  - LFTs, lipase wnl     Fluids/ Electrolytes/ Renal:   -  ml/hr for IV fluid hydration  - K 4.1, Cr 1.88 (2.3 on admission, baseline 0.9)     Endocrine:  #DM    - Sliding scale insulin  - No PTA meds     ID/ Antibiotics:  - Zosyn  - WBC 24 post op  - Abdominal fluid cultures pending     Heme:  # Chronic anemia     - Hgb 9.8 post op     Prophylaxis:    - Mechanical prophylaxis for DVT.     Lines/ tubes/ drains:  - PIV  - ETT  - Shah  - R internal jugular triple lumen CVC  - R axillary arterial line     Disposition:  - Surgical ICU    CODE:  - Full    - - - - - - - - - - - - - - - - - - - - - - - - - - - - - - - - - - - - - - - - - - - - - - - - - - - - - - - - - - - - - - - - - - - - -     PRIMARY TEAM: Surgery  PRIMARY PHYSICIAN: Nataliia    REASON FOR CRITICAL CARE ADMISSION: Need for mechanical ventilation   ADMITTING PHYSICIAN: Naya    HISTORY PRESENTING ILLNESS: Nico is a 55-year-old male with a PMHx s/f DM, HTN who presented on 3/24 for RUQ pain, found to have cholecystitis. He is now POD #0 s/p laparoscopic partial cholecystectomy. Transferred to SICU after becoming apneic and non-responsive and re-intubation in PACU.     REVIEW OF SYSTEMS: Negative except as mentioned in the HPI    PAST MEDICAL HISTORY:    History reviewed. No pertinent past medical history.    SURGICAL HISTORY:   History reviewed. No pertinent surgical history.    SOCIAL HISTORY: Tobacco - never. Etoh - never.     FAMILY HISTORY: No bleeding/clotting disorders nor problems with anesthesia.     ALLERGIES:    No Known Allergies    MEDICATIONS:  No current facility-administered medications on file prior to encounter.   amLODIPine-benazepril (LOTREL) 10-40 MG capsule, Take 1 capsule by mouth daily  atorvastatin (LIPITOR) 40 MG tablet, Take 40 mg by mouth daily        PHYSICAL EXAMINATION:  Temp:  [96.8  F (36  C)-100.7  F (38.2  C)] 97.5  F (36.4  C)  Pulse:  [] 68  Resp:  [12-20] 12  BP: ()/(53-96) 91/58  MAP:  [11 mmHg] 11 mmHg  Arterial Line BP: (11)/(11) 11/11  FiO2 (%):  [50 %-60 %] 50 %  SpO2:  [70 %-100 %] 98 %  General: Non responsive  HEENT: Normocephalic, atraumatic. Patent nares  Neck: No cervical lymphadenopathy. No thyromegaly.  Chest wall: Symmetric thorax. No masses or tenderness to palpation.  Respiratory: Mechanically ventilate  Cardiovascular: Regular rate and rhythm.  Gastrointestinal: Abdomen soft, non-distended, incisions dressed, GISELE with bloody output  Genitourinary: Normal genitalia.  Extremities: Moving all four extremities. No limb deformities. No pedal edema. Warm and well perfused. DP pulses palpable bilaterally.  Skin: As noted above. No rashes or lesions appreciated.    LABS: Reviewed.   Arterial Blood Gases   Recent Labs   Lab 03/25/21  1430   PH 7.31*   PCO2 44   PO2 134*   HCO3 22     Complete Blood Count   Recent Labs   Lab 03/25/21  1200 03/25/21  0700 03/24/21  2133   WBC 24.2* 18.3* 26.8*   HGB 9.8* 10.3* 10.8*    176 223     Basic Metabolic Panel  Recent Labs   Lab 03/25/21  1200 03/25/21  0700 03/24/21  2133    138 136   POTASSIUM 4.1 3.6 3.7   CHLORIDE 109 108 103   CO2 23 22 25   BUN 35* 33* 30   CR 1.88* 1.92* 2.31*   * 86 105*     Liver Function Tests  Recent Labs   Lab  03/25/21  0700 03/24/21  2133   AST 37 36   ALT 24 27   ALKPHOS 125 119   BILITOTAL 1.4* 1.3   ALBUMIN 2.9* 3.4     Pancreatic Enzymes  Recent Labs   Lab 03/25/21  0700   LIPASE 75     Coagulation Profile  No lab results found in last 7 days.  Lactate  Invalid input(s): LACTATE    IMAGING:  Results for orders placed or performed during the hospital encounter of 03/24/21   XR Chest 2 Views    Narrative    Exam: XR CHEST 2 VW, 3/24/2021 9:18 PM    Indication: Fever    Comparison: None available    Findings:   PA and lateral upright view of the chest. The cardiomediastinal  silhouette and upper abdomen are unremarkable.    There is no pleural effusion. No pneumothorax. No focal airspace  opacities.      Impression    IMPRESSION: No acute cardiopulmonary findings.    I have personally reviewed the examination and initial interpretation  and I agree with the findings.    NGHIA WATTS MD   CT Abdomen Pelvis w/o Contrast    Narrative    EXAM: CT ABDOMEN PELVIS W/O CONTRAST  LOCATION: [facility]  DATE/TIME: 3/24/2021 10:40 PM    INDICATION: Abdominal pain, fever  COMPARISON: None.  TECHNIQUE: CT scan of the abdomen and pelvis was performed without IV contrast. Multiplanar reformats were obtained. Dose reduction techniques were used.  CONTRAST: None.    FINDINGS:   LOWER CHEST: Heavy coronary artery calcifications.    HEPATOBILIARY: Prominent inflammatory changes of gallbladder wall concerning for acute cholecystitis. No obvious stones.    PANCREAS: Normal.    SPLEEN: Normal.    ADRENAL GLANDS: Normal.    KIDNEYS/BLADDER: Small posterior exophytic cyst upper pole right kidney with kidneys otherwise negative.    BOWEL: There appears to be some soft tissue stranding adjacent to the hepatic flexure but this appears to relate to the adjacent inflamed gallbladder rather than intrinsic colonic inflammation. A few scattered diverticula are present. Appendix normal.    LYMPH NODES: Normal.    VASCULATURE:  Unremarkable.    PELVIC ORGANS: Normal.    MUSCULOSKELETAL: Fat-containing umbilical hernia. No suspicious bony lesion.      Impression    IMPRESSION:   1.  Prominent wall thickening and soft tissue stranding surrounding gallbladder worrisome for acute cholecystitis.     POC US ABDOMEN LIMITED (FAST/RUQ)    Impression    Limited Bedside Gallbladder Ultrasound, performed and interpreted by me.  Indication: Abdominal Pain  The gall bladder (GB) was evaluated along the short and long axis in real time.   Findings  The gall bladder is dilated  The anterior GB wall measures >6mm.  GB stones are present. GB sludge is present.  The common bile duct was visualized and measures less than 6 mm in luminal diameter   Sonographic villasenor sign is equivocal.      IMPRESSION: Suspicious for acute cholecystitis with increased gallbladder wall thickness, pericholecystic fluid, and sludge.     US Abdomen Limited (RUQ)     Value    Radiologist flags Findings suggestive of acute cholecystitis. (Urgent)    Narrative    EXAMINATION: Limited Abdominal Ultrasound, 3/25/2021 1:53 AM     COMPARISON: CT 3/24/2021    HISTORY: CT findings concerning for acute cholecystitis    FINDINGS:   Fluid: No evidence of ascites or pleural effusions.    Liver: The liver demonstrates normal echotexture, measuring 14.8 cm in  craniocaudal dimension. There is no focal mass.     Gallbladder: Gallbladder is moderately distended with echogenic  sludge. Multiple small mobile stones. There is pericholecystic edema  and wall thickening measuring up to 8 mm. Sonographic Villasenor sign was  negative.    Bile Ducts: Both the intra- and extrahepatic biliary system are of  normal caliber.  The common bile duct measures 3 mm in diameter.    Pancreas: Visualized portions of the head and body of the pancreas are  unremarkable.     Kidney: The right kidney measures 13.1 cm long. There is no  hydronephrosis or hydroureter, no shadowing renal calculi, cystic  lesion or mass.        Impression    IMPRESSION:   Cholelithiasis and gallbladder sludge with wall thickening and  pericholecystic edema. Findings suggestive of acute cholecystitis,  however sonographic Villasenor sign was negative. A nuclear medicine scan  could be considered to confirm acute cholecystitis.    [Urgent Result: Findings suggestive of acute cholecystitis.]    Finding was identified on 3/25/2021 1:53 AM.     Dr. Ness was contacted by Dr. Chirag Bacon DO (Radiology R4) at  3/25/2021 2:01 AM and verbalized understanding of the urgent finding.     I have personally reviewed the examination and initial interpretation  and I agree with the findings.    ACE MARVIN MD   POC US Guidance Needle Placement    Impression    TAP block        Patient seen, findings and plan discussed with surgical ICU staff.

## 2021-03-25 NOTE — PROGRESS NOTES
SURGERY PROGRESS NOTE    WBC trending down, Cr improving. Bili the same (1.4 vs 1.3), lipase WNL, normal CBD, Alk Phos WNL. Plan for laparoscopic cholecystectomy without IOC.     Rola Rankin MD  Chief Resident (General Surgery)  PGY 5

## 2021-03-25 NOTE — ED PROVIDER NOTES
[place] EMERGENCY DEPARTMENT ([facility])  3/24/21  History     Chief Complaint   Patient presents with     Fever     The history is provided by the patient and medical records.     Nico Coello is a 55 year old male who points to the emergency department for evaluation of a chills and generalized body pain. The patient reports feeling feverish and recorded a temperature of 100 degrees last night. He then developed chills and felt shaky. The patient took 3 aleve and was finally able to fall asleep around 5 AM today. The patient felt fine throughout the day until this evening when he suddenly had his fever and chills return which has persistent since his arrival in the ED. He also notes developing generalized body aches and specifically right-sided back pain. This evening the patient became dizzy while ambulating to the bathroom. The patient denies experiencing similar symptoms in the past. He denies sore throat, abdominal pain, dysuria, lower extremity swelling or recent rashes. The patient did receive his 2nd dose of the Moderna COVID-19 vaccine 14-days ago.     11:00 PM  The patient was reevaluated by Dr. David Phillips in Room ED 10.     Denies pain after eating. No prior abdominal surgeries.      History reviewed. No pertinent past medical history.    History reviewed. No pertinent surgical history.    History reviewed. No pertinent family history.    Social History     Tobacco Use     Smoking status: Never Smoker     Smokeless tobacco: Never Used   Substance Use Topics     Alcohol use: Never     Frequency: Never     Current Facility-Administered Medications   Medication     melatonin tablet 1 mg     ondansetron (ZOFRAN-ODT) ODT tab 4 mg    Or     ondansetron (ZOFRAN) injection 4 mg     sodium chloride (PF) 0.9% PF flush 3 mL     sodium chloride (PF) 0.9% PF flush 3 mL     sodium chloride 0.9% infusion     Current Outpatient Medications   Medication     amLODIPine-benazepril (LOTREL) 10-40 MG  capsule     atorvastatin (LIPITOR) 40 MG tablet      No Known Allergies      Review of Systems   Constitutional: Positive for chills and fever (Subjective).   HENT: Negative for sore throat.    Respiratory: Negative for cough.    Cardiovascular: Negative for leg swelling.   Gastrointestinal: Negative for abdominal pain.   Genitourinary: Negative for dysuria.   Musculoskeletal: Positive for back pain (Right-sided).   Skin: Negative for rash.   Neurological: Positive for dizziness.     A complete review of systems was performed with pertinent positives and negatives noted in the HPI, and all other systems negative.    Physical Exam   BP: (!) 156/70  Pulse: 123  Temp: (Patient Refused)  Resp: 16  SpO2: 100 %  Physical Exam  Vitals signs and nursing note reviewed.   Constitutional:       Appearance: He is obese. He is not toxic-appearing or diaphoretic.   HENT:      Head: Normocephalic and atraumatic.      Left Ear: External ear normal.      Nose: No rhinorrhea.      Mouth/Throat:      Pharynx: Oropharynx is clear. No oropharyngeal exudate.   Eyes:      General: No scleral icterus.     Conjunctiva/sclera: Conjunctivae normal.      Pupils: Pupils are equal, round, and reactive to light.   Neck:      Musculoskeletal: Normal range of motion and neck supple.   Cardiovascular:      Rate and Rhythm: Tachycardia present.      Pulses: Normal pulses.   Pulmonary:      Effort: Pulmonary effort is normal. No respiratory distress.      Breath sounds: Normal breath sounds. No wheezing or rhonchi.   Abdominal:      General: Abdomen is flat. There is no distension.      Palpations: There is no mass.      Tenderness: There is no right CVA tenderness, left CVA tenderness or guarding.   Musculoskeletal: Normal range of motion.         General: No swelling or tenderness.      Right lower leg: No edema.      Left lower leg: No edema.   Skin:     General: Skin is warm and dry.      Capillary Refill: Capillary refill takes less than 2  seconds.      Coloration: Skin is not jaundiced or pale.      Findings: No rash.   Neurological:      General: No focal deficit present.      Mental Status: He is alert and oriented to person, place, and time.   Psychiatric:         Mood and Affect: Mood normal.         Speech: Speech normal.         Behavior: Behavior is cooperative.         Cognition and Memory: Cognition normal.       ED Course     ED Course as of Mar 25 0130   Wed Mar 24, 2021   2129                                                            IMPRESSION: No acute cardiopulmonary findings.   XR Chest 2 Views   2129 Significant for marked leukocytosis with WBC 26.8.  Hemoglobin low at 10.8.  Platelets 223.     CBC with platelets differential(!)   2223 Significant for Cr 2.31.    Comprehensive metabolic panel(!)   2223 Lactic Acid: 1.9   2223 IMPRESSION: No acute cardiopulmonary findings.   XR Chest 2 Views   2223 Temp: 100.7  F (38.2  C)   2225 Awaiting UA and non-contrast CT Abd/Pelvis.  Covid pending.    Plan for inpatient medicine admission.        2230 Recontacted by general surgery, will present to ED for consultation.       2237 Ph Venous: 7.40   2237 PCO2 Venous: 41   2237 Bicarbonate Venous: 25   2318 IMPRESSION:   1.  Prominent wall thickening and soft tissue stranding surrounding gallbladder worrisome for acute cholecystitis.   CT Abdomen Pelvis w/o Contrast   2318   IMPRESSION: Suspicious for acute cholecystitis with increased gallbladder wall thickness, pericholecystic fluid, and sludge.    POC US ABDOMEN LIMITED (FAST/RUQ)   2319 Paged general surgery      Thu Mar 25, 2021   0010 Negative   Symptomatic Influenza A/B & SARS-CoV2 (COVID-19) Virus PCR Multiplex   0112 General surgery consult complete, admission accepted to surgical service by Dr. William.  Request ECG and comprehensive RUQ ultrasound.         8:53 PM  The patient was seen and examined by Dr. David Phillips in Room ED 10.     Procedures  Results for orders placed during  the hospital encounter of 03/24/21   POC US ABDOMEN LIMITED    Impression Limited Bedside Gallbladder Ultrasound, performed and interpreted by me.  Indication: Abdominal Pain  The gall bladder (GB) was evaluated along the short and long axis in real time.   Findings  The gall bladder is dilated  The anterior GB wall measures >6mm.  GB stones are present. GB sludge is present.  The common bile duct was visualized and measures less than 6 mm in luminal diameter   Sonographic chandler sign is equivocal.      IMPRESSION: Suspicious for acute cholecystitis with increased gallbladder wall thickness, pericholecystic fluid, and sludge.               EKG Interpretation:      Interpreted by David Phillips MD  Time reviewed: 0125  Symptoms at time of EKG: Fever   Rhythm: normal sinus   Rate: Normal  Axis: Normal  Ectopy: none  Conduction: nonspecific interventricular conduction block  ST Segments/ T Waves: No acute ischemic changes, Non-specific ST-T wave changes and Poor R wave progression  Q Waves: III and aVf  Comparison to prior: No old EKG available    Clinical Impression: non-specific EKG.  No prior for comparison.  Non-specific intraventricular block.  Q waves in III, aVF.         Results for orders placed or performed during the hospital encounter of 03/24/21   XR Chest 2 Views     Status: None    Narrative    Exam: XR CHEST 2 VW, 3/24/2021 9:18 PM    Indication: Fever    Comparison: None available    Findings:   PA and lateral upright view of the chest. The cardiomediastinal  silhouette and upper abdomen are unremarkable.    There is no pleural effusion. No pneumothorax. No focal airspace  opacities.      Impression    IMPRESSION: No acute cardiopulmonary findings.    I have personally reviewed the examination and initial interpretation  and I agree with the findings.    NGHIA WATTS MD   CT Abdomen Pelvis w/o Contrast     Status: None    Narrative    EXAM: CT ABDOMEN PELVIS W/O CONTRAST  LOCATION: [facility]  Services  DATE/TIME: 3/24/2021 10:40 PM    INDICATION: Abdominal pain, fever  COMPARISON: None.  TECHNIQUE: CT scan of the abdomen and pelvis was performed without IV contrast. Multiplanar reformats were obtained. Dose reduction techniques were used.  CONTRAST: None.    FINDINGS:   LOWER CHEST: Heavy coronary artery calcifications.    HEPATOBILIARY: Prominent inflammatory changes of gallbladder wall concerning for acute cholecystitis. No obvious stones.    PANCREAS: Normal.    SPLEEN: Normal.    ADRENAL GLANDS: Normal.    KIDNEYS/BLADDER: Small posterior exophytic cyst upper pole right kidney with kidneys otherwise negative.    BOWEL: There appears to be some soft tissue stranding adjacent to the hepatic flexure but this appears to relate to the adjacent inflamed gallbladder rather than intrinsic colonic inflammation. A few scattered diverticula are present. Appendix normal.    LYMPH NODES: Normal.    VASCULATURE: Unremarkable.    PELVIC ORGANS: Normal.    MUSCULOSKELETAL: Fat-containing umbilical hernia. No suspicious bony lesion.      Impression    IMPRESSION:   1.  Prominent wall thickening and soft tissue stranding surrounding gallbladder worrisome for acute cholecystitis.     POC US ABDOMEN LIMITED (FAST/RUQ)     Status: None    Impression    Limited Bedside Gallbladder Ultrasound, performed and interpreted by me.  Indication: Abdominal Pain  The gall bladder (GB) was evaluated along the short and long axis in real time.   Findings  The gall bladder is dilated  The anterior GB wall measures >6mm.  GB stones are present. GB sludge is present.  The common bile duct was visualized and measures less than 6 mm in luminal diameter   Sonographic chandler sign is equivocal.      IMPRESSION: Suspicious for acute cholecystitis with increased gallbladder wall thickness, pericholecystic fluid, and sludge.     CBC with platelets differential     Status: Abnormal   Result Value Ref Range    WBC 26.8 (H) 4.0 - 11.0 10e9/L     RBC Count 4.05 (L) 4.4 - 5.9 10e12/L    Hemoglobin 10.8 (L) 13.3 - 17.7 g/dL    Hematocrit 34.4 (L) 40.0 - 53.0 %    MCV 85 78 - 100 fl    MCH 26.7 26.5 - 33.0 pg    MCHC 31.4 (L) 31.5 - 36.5 g/dL    RDW 17.6 (H) 10.0 - 15.0 %    Platelet Count 223 150 - 450 10e9/L    Diff Method Automated Method     % Neutrophils 93.7 %    % Lymphocytes 2.4 %    % Monocytes 2.9 %    % Eosinophils 0.1 %    % Basophils 0.2 %    % Immature Granulocytes 0.7 %    Nucleated RBCs 0 0 /100    Absolute Neutrophil 25.1 (H) 1.6 - 8.3 10e9/L    Absolute Lymphocytes 0.7 (L) 0.8 - 5.3 10e9/L    Absolute Monocytes 0.8 0.0 - 1.3 10e9/L    Absolute Eosinophils 0.0 0.0 - 0.7 10e9/L    Absolute Basophils 0.1 0.0 - 0.2 10e9/L    Abs Immature Granulocytes 0.2 0 - 0.4 10e9/L    Absolute Nucleated RBC 0.0    Comprehensive metabolic panel     Status: Abnormal   Result Value Ref Range    Sodium 136 133 - 144 mmol/L    Potassium 3.7 3.4 - 5.3 mmol/L    Chloride 103 94 - 109 mmol/L    Carbon Dioxide 25 20 - 32 mmol/L    Anion Gap 8 3 - 14 mmol/L    Glucose 105 (H) 70 - 99 mg/dL    Urea Nitrogen 30 7 - 30 mg/dL    Creatinine 2.31 (H) 0.66 - 1.25 mg/dL    GFR Estimate 30 (L) >60 mL/min/[1.73_m2]    GFR Estimate If Black 35 (L) >60 mL/min/[1.73_m2]    Calcium 9.1 8.5 - 10.1 mg/dL    Bilirubin Total 1.3 0.2 - 1.3 mg/dL    Albumin 3.4 3.4 - 5.0 g/dL    Protein Total 8.5 6.8 - 8.8 g/dL    Alkaline Phosphatase 119 40 - 150 U/L    ALT 27 0 - 70 U/L    AST 36 0 - 45 U/L   Lactic acid whole blood     Status: None   Result Value Ref Range    Lactic Acid 1.9 0.7 - 2.0 mmol/L   Symptomatic Influenza A/B & SARS-CoV2 (COVID-19) Virus PCR Multiplex     Status: None    Specimen: Nasopharyngeal   Result Value Ref Range    Flu A/B & SARS-COV-2 PCR Source Nasopharyngeal     SARS-CoV-2 PCR Result NEGATIVE     Influenza A PCR Negative NEG^Negative    Influenza B PCR Negative NEG^Negative    Respiratory Syncytial Virus PCR Negative NEG^Negative    Flu A/B & SARS-CoV-2 PCR Comment  (Note)    Blood gas venous and oxyhgb     Status: None   Result Value Ref Range    Ph Venous 7.40 7.32 - 7.43 pH    PCO2 Venous 41 40 - 50 mm Hg    PO2 Venous 38 25 - 47 mm Hg    Bicarbonate Venous 25 21 - 28 mmol/L    FIO2 21     Oxyhemoglobin Venous 67 %    Base Excess Venous 0.1 mmol/L   EKG 12-lead, tracing only     Status: None (Preliminary result)   Result Value Ref Range    Interpretation ECG Click View Image link to view waveform and result    Blood culture     Status: None (Preliminary result)    Specimen: Arm, Right; Blood    Right Arm   Result Value Ref Range    Specimen Description Blood Right Arm     Culture Micro No growth after 2 hours    Blood culture     Status: None (Preliminary result)    Specimen: Arm, Right; Blood    Right Arm   Result Value Ref Range    Specimen Description Blood Right Arm     Culture Micro No growth after 1 hour      Medications   sodium chloride (PF) 0.9% PF flush 3 mL (has no administration in time range)   sodium chloride (PF) 0.9% PF flush 3 mL (3 mLs Intracatheter Given 3/24/21 2145)   melatonin tablet 1 mg (has no administration in time range)   ondansetron (ZOFRAN-ODT) ODT tab 4 mg (has no administration in time range)     Or   ondansetron (ZOFRAN) injection 4 mg (has no administration in time range)   sodium chloride 0.9% infusion (has no administration in time range)   0.9% sodium chloride BOLUS (0 mLs Intravenous Stopped 3/24/21 2253)   0.9% sodium chloride BOLUS (0 mLs Intravenous Stopped 3/24/21 2345)   piperacillin-tazobactam (ZOSYN) 3.375 g vial to attach to  mL bag (3.375 g Intravenous New Bag 3/24/21 2342)        Assessments & Plan (with Medical Decision Making)   55-year-old male presenting with report of 2 days of fever, malaise, rigors without clear nidus of infection.  He denies productive cough, denies focal abdominal pain, denies dysuria, denies skin rash.  He has received 2 Covid vaccine doses, most recently 2 weeks prior.  He has no indwelling  catheters.  Differential diagnosis includes viral syndrome, pneumonia, intra-abdominal infection, urinary tract infection/prostatitis/ureteral obstruction.  Plan at this time is to obtain screening sepsis lab panel, urine with urine culture, chest x-ray; will consider CT abdomen/pelvis if no clear etiology is determined.  Will obtain Covid testing.  Disposition pending results of diagnostic evaluation and response to therapeutic interventions.  I have reviewed the nursing notes. I have reviewed the findings, diagnosis, plan and need for follow up with the patient.  Remainder of documentation per ED course notes.      Final diagnoses:   Acute cholecystitis     Disposition:  Admit to surgery, planned operative intervention.    IKirby, am serving as a trained medical scribe to document services personally performed by David Phillips MD, based on the provider's statements to me.      IDavid MD, was physically present and have reviewed and verified the accuracy of this note documented by Kirby Forte.  --  David Phillips MD  [facility] EMERGENCY DEPARTMENT  3/24/2021     David Phillips MD  03/25/21 0131

## 2021-03-25 NOTE — OP NOTE
[facility]    Operative Note    Pre-operative diagnosis: Acute cholecystitis    Post-operative diagnosis Acute gangrenous cholecystitis      Procedure: 1. Laparoscopic fenestrated subtotal cholecystectomy   2. Primary umbilical hernia repair      Surgeon: Surgeon(s) and Role:     * Lauren William MD - Primary     * Rola Rankin MD - Resident - Assisting     * Amy Sapp MD - Resident - Assisting      * Hansel Lozada MS3     Anesthesia: General      Estimated blood loss: 50 ml     Drains: 19 Fr GISELE      Specimens: ID Type Source Tests Collected by Time Destination   1 : Abdomen Pus Fluid Abdomen ANAEROBIC BACTERIAL CULTURE, FLUID CULTURE AEROBIC BACTERIAL, FUNGUS CULTURE, GRAM STAIN Lauren William MD 3/25/2021  9:22 AM    2 : Gallbladder Contents Fluid Gallbladder and Contents ANAEROBIC BACTERIAL CULTURE, FLUID CULTURE AEROBIC BACTERIAL, FUNGUS CULTURE, GRAM STAIN Lauren William MD 3/25/2021  9:25 AM    A : Partial Gallbladder Organ Gallbladder and Contents SURGICAL PATHOLOGY EXAM Lauren William MD 3/25/2021 10:20 AM           Complications: None .   Implants: None.       OPERATIVE INDICATIONS:      Nico Coello is a 55 year old male who presented  with acute cholecystitis, leukocytosis and bacteremia. After understanding the risks and benefits of proceeding with surgery, the patient has an indication for laparoscopic cholecystectomy and consented to undergo surgery.    We reviewed the risks of surgery with Nico Coello.    These include, but are not limited to, death, myocardial infarction, pneumonia, urinary tract infection, deep venous thrombosis with or without pulmonary embolus, abdominal infection from bowel injury or abscess, bowel obstruction, wound infection, and bleeding.    More specific risks related to laparoscopic cholecystectomy include bile duct injury (3/1000), bile leak  (10/1000), retained common bile duct stone (10/1000), postcholecystectomy diarrhea (1-2%) and these complications may require additional treatment.    OPERATIVE DETAILS:      The patient was brought to the operating room and prepared in a routine fashion.  A timeout was performed prior to surgery and documented by the nursing team.     Under the benefits of general anesthesia, a infraumbilical curvilinear incision was made . Blunt dissection used to expose fascia. The umbilical hernia and stalk were encircled. The sac was dissected free off the stalk and  entered and the omental fat was reduced into the peritoneal cavity. A 12 mm port was placed and  pneumoperitoneum was established using carbon dioxide gas to a maximum pressure of 15 mmHg.  A total of 4 ports were placed and the laparoscope was utilized from the umbilical port.     The patient was moved into a steep reverse Trendelenberg position and left lateral decubitus position.    Numerous adhesions to the gallbladder were taken down with a combination of blunt and sharp dissection. There was purulent fluid in RUQ which was sampled. The gallbladder was decompressed using a laparoscopic needle and kati pus was aspirated. The fluid was sent for cultures as well.    The gallbladder was grasped at its fundus and retracted cephalad.  It was also grasped at its infundibulum and retracted laterally.  Findings related to the gallbladder are as noted above.     We dissected the infundibulum of gallbladder off the omental adhesions but more proximally there were no obvious planes and the cystic duct appeared to be buried in inflammatory rind. Moreover the gallbladder had areas of kati necrosis. We decided to perform a subtotal cholecystectomy. We started a top down approach . The back wall of the gallbladder was necrotic so we ended up removing the anterior and lateral wall of the gallbladder. The proximal transection point was at the infundibulum and there was still  a cuff of gallbladder wall left attached to the cystic duct. We removed the specimen using endocatch bag. The posterior wall was cauterized and hemostasis achieved. A 19 Fr GISELE was left in the gallbladder fossa. The cystic duct is likely occluded given no bilious output from the cuff of remnant gallbladder .     Next, the gallbladder fossa was irrigated with a small aliquot of saline and the saline was aspirated.     Complete hemostasis was achieved.      The umbilical hernia defect was closed using  Multiple 0 Vicryl suture in figure of eight fashion.     The umbilical stack was approximated to fascia with a 3-0 vicryl interrupted stitch and given a Class IV case, skin was approximated with skin kristina.      Dr. William  was present for all critical components of the operation and all needle and sponge counts were correct x2 at the end of the procedure.    Rola Rankin MD  PGY 5

## 2021-03-25 NOTE — ANESTHESIA PREPROCEDURE EVALUATION
Anesthesia Pre-Procedure Evaluation    Patient: Nico Coello   MRN: [id number] : 1965        Preoperative Diagnosis: * No pre-op diagnosis entered *   Procedure : Procedure(s):  CHOLECYSTECTOMY, LAPAROSCOPIC     History reviewed. No pertinent past medical history.   History reviewed. No pertinent surgical history.   No Known Allergies   Social History     Tobacco Use     Smoking status: Never Smoker     Smokeless tobacco: Never Used   Substance Use Topics     Alcohol use: Never     Frequency: Never      Wt Readings from Last 1 Encounters:   19 117.9 kg (260 lb)        Anesthesia Evaluation            ROS/MED HX  ENT/Pulmonary:  - neg pulmonary ROS     Neurologic:  - neg neurologic ROS     Cardiovascular:     (+) hypertension-----Previous cardiac testing   Echo: Date: Results:    Stress Test: Date: Results:    ECG Reviewed: Date: Results:  Sinus rhythm  Non-specific intra-ventricular conduction block  Inferior infarct , age undetermined  Cath: Date: Results:   (-) murmur and wheezes   METS/Exercise Tolerance:     Hematologic:  - neg hematologic  ROS     Musculoskeletal:  - neg musculoskeletal ROS     GI/Hepatic:     (+) cholecystitis/cholelithiasis,     Renal/Genitourinary:  - neg Renal ROS     Endo:     (+) Obesity,     Psychiatric/Substance Use:  - neg psychiatric ROS     Infectious Disease: Comment: COVID Negative   - neg infectious disease ROS     Malignancy:  - neg malignancy ROS     Other:  - neg other ROS          Physical Exam    Airway        Mallampati: II   TM distance: > 3 FB   Neck ROM: full   Mouth opening: > 3 cm    Respiratory Devices and Support         Dental  no notable dental history         Cardiovascular          Rhythm and rate: regular and normal (-) no systolic click and no murmur    Pulmonary   pulmonary exam normal        breath sounds clear to auscultation   (-) no wheezes        OUTSIDE LABS:  CBC:   Lab Results   Component Value Date    WBC 26.8 (H) 2021    HGB 10.8  (L) 03/24/2021    HCT 34.4 (L) 03/24/2021     03/24/2021     BMP:   Lab Results   Component Value Date     03/24/2021    POTASSIUM 3.7 03/24/2021    CHLORIDE 103 03/24/2021    CO2 25 03/24/2021    BUN 30 03/24/2021    CR 2.31 (H) 03/24/2021     (H) 03/24/2021     COAGS: No results found for: PTT, INR, FIBR  POC: No results found for: BGM, HCG, HCGS  HEPATIC:   Lab Results   Component Value Date    ALBUMIN 3.4 03/24/2021    PROTTOTAL 8.5 03/24/2021    ALT 27 03/24/2021    AST 36 03/24/2021    ALKPHOS 119 03/24/2021    BILITOTAL 1.3 03/24/2021     OTHER:   Lab Results   Component Value Date    LACT 1.9 03/24/2021    MICAELA 9.1 03/24/2021       Anesthesia Plan    ASA Status:  2   NPO Status:  NPO Appropriate    Anesthesia Type: General.     - Airway: ETT   Induction: Intravenous.   Maintenance: Inhalation.        Consents    Anesthesia Plan(s) and associated risks, benefits, and realistic alternatives discussed. Questions answered and patient/representative(s) expressed understanding.     - Discussed with:  Patient      - Extended Intubation/Ventilatory Support Discussed: Yes.      - Patient is DNR/DNI Status: No    Use of blood products discussed: Yes.     - Discussed with: Patient.     Postoperative Care    Pain management: IV analgesics.   PONV prophylaxis: Ondansetron (or other 5HT-3), Dexamethasone or Solumedrol     Comments:                Royce Amaral DO

## 2021-03-25 NOTE — PROGRESS NOTES
Pt arrived from OR at 1051. Pt very sedated but maintaining sats and airway. Pt aroused and verbalized he had no pain but was nauseous. 4mg zofran given. Blood sugar was 100. 10 minutes later pt sats dropped to 55%. Pt was not arousable via sternal rub. Code button pushed. Oral airway inserted and BVM. Narcan given 3 separate times without response. Pt remained unresponsive and pt was intubated by anesthesia at the bedside. 12 lead EKG ordered, CBC/BMP sent, stat head CT ordered. VSS. Propofol gtt started at 30 mcg/kg/min per Dr. Behrens. Report given to QING Lopez 4A.

## 2021-03-25 NOTE — ANESTHESIA CARE TRANSFER NOTE
Patient: Nico Coello    Procedure(s):  CHOLECYSTECTOMY, LAPAROSCOPIC, PRIMARY UMILICAL HERNIA REPAIR    Diagnosis: Acute cholecystitis [K81.0]  Diagnosis Additional Information: No value filed.    Anesthesia Type:   General     Note:    Oropharynx: oropharynx clear of all foreign objects and spontaneously breathing  Level of Consciousness: drowsy  Oxygen Supplementation: nasal cannula  Level of Supplemental Oxygen (L/min / FiO2): 3  Independent Airway: airway patency satisfactory and stable  Dentition: dentition unchanged  Vital Signs Stable: post-procedure vital signs reviewed and stable  Report to RN Given: handoff report given  Patient transferred to: PACU    Handoff Report: Identifed the Patient, Identified the Reponsible Provider, Reviewed the pertinent medical history, Discussed the surgical course, Reviewed Intra-OP anesthesia mangement and issues during anesthesia, Set expectations for post-procedure period and Allowed opportunity for questions and acknowledgement of understanding      Vitals: (Last set prior to Anesthesia Care Transfer)  CRNA VITALS  3/25/2021 1020 - 3/25/2021 1056      3/25/2021             NIBP:  100/60    Pulse:  80    NIBP Mean:  75    Ht Rate:  80    SpO2:  98 %        Electronically Signed By: LINDA Strange CRNA  March 25, 2021  10:56 AM

## 2021-03-25 NOTE — ANESTHESIA PROCEDURE NOTES
Airway       Patient location during procedure: PACU       Procedure Start/Stop Times: 3/25/2021 11:55 AM  Staff -        Resident/Fellow: Madie Starks MD       Performed By: resident  Consent for Airway        Urgency: emergent       Consent: The procedure was performed in an emergent situation.  Report Obtained from Primary Care Team       History regarding most recent potassium obtained: Yes       History regarding presence/absence of renal failure obtained:Yes       History regarding stroke/CVA obtained:Yes       History regarding presence/absence of NM disorder: YesIndications and Patient Condition       Indications for airway management: altered level of consciousness, airway protection and respiratory insufficiency       Mallampati: Not Assessed     Induction type:intravenous       Mask difficulty assessment: 2 - vent by mask + OA or adjuvant +/- NMBA    Final Airway Details       Final airway type: endotracheal airway       Successful airway: ETT - single  Endotracheal Airway Details        ETT size (mm): 8.0       Cuffed: yes       Successful intubation technique: video laryngoscopy       VL Blade Size: MAC 3       Grade View of Cords: 1       Adjucts: stylet       Position: Center       Secured at (cm): 24    Post intubation assessment        Placement verified by: capnometry, equal breath sounds and chest rise        Number of attempts at approach: 1       Secured with: commercial tube arriola    Medication(s) Administered   propofol (DIPRIVAN) injection 10 mg/mL vial, 30 mg  rocuronium (ZEMURON) 10 mg/mL injection, 40 mg  Medication Administration Time: 3/25/2021 11:37 AM    Additional Comments       Anesthesia Out of OR Intubation     Patient:Nico Coello ([id number])  Site: [facility]  The Patient received  30 mg of  Propogol for Induction and 40 mg of  Rocuronium for Neuromuscular Blockade.  The patient will be paralyzed for approximately 45min-1hour.  The primary team has ordered  Low dose  propofol for sedation during this period of paralysis and will be started by the primary nurse within  5 minutes.    LINDA Laura CRNA 3/25/2021 12:57 PM

## 2021-03-25 NOTE — PROGRESS NOTES
SURGERY PROGRESS NOTE    Patient is s/p laparoscopic subtotal cholecystectomy for intraoperatively diagnosed gangrenous cholecystitis  (pus in GB). Patient was extubated in PACU and became apneic requiring reintubation. CT head neg for stroke. Bedside echo without right heart strain.  Labs show leukocytosis and slught decrease in Hb. Not hyperglycemic. Making adequate UOP    -  CT PE to rule out PE  - Ok for heparinization if CT + for PE   - LR bolus x 1 prior to CT PE     D/w Dr. Nataliia Rankin MD  Chief Resident (General Surgery)  PGY 5

## 2021-03-25 NOTE — PLAN OF CARE
"/57   Pulse 81   Temp 99.1  F (37.3  C) (Axillary)   Resp 20   Ht 1.651 m (5' 5\")   Wt 102.7 kg (226 lb 6.4 oz)   SpO2 99%   BMI 37.67 kg/m      Status: Acute cholecystitis  Activity: Up ad enoch.  Neuros: A&Ox4, no deficits noted.  Cardiac: WDL, denies chest pain.  Respiratory: WDL on RA, denies SOB.  GI/: +BS, LBM 23/23, voids spont with AUOP.  Diet: NPO.  Skin/Incisions: WDL.  Admitted/transferred from:   2 RN full   skin assessment completed by Kristina Freeman RN and Josy ALMANZA RN.  Skin assessment finding: skin intact, no problems   Interventions/actions: skin interventions include pt education on importance of mobility and good nutrition for skin health.    Lines/Drains: R PIV running NS at 150.  Pain/Nausea: Pain managed with PRN oxycodone. Denies nausea.  New Changes: Pt admitted from ER around 0330, vitally stable.  Plan: Continue to monitor and follow POC. Poss lap lila today.    "

## 2021-03-25 NOTE — PLAN OF CARE
Pt transferred from PACU post lap lila. Pt intubated on arrival, unresponsive. Pupils 1mm sluggish. Pt was hypotensive on arrival and throughout end of shift (2L bolus given); leslie having low UO (team aware); on CMV 50% fio2, 500 TV, 5 PEEP, 12 RR; propofol @ 30, fentanyl @25; WBC 24, Creat 1.8; POA updated

## 2021-03-25 NOTE — ED TRIAGE NOTES
Pt presents ambulatory to triage with c/o of fever. Pt refused to have temp or O2 level checked. Pt stated temp was 99.7 at home. Pt stated he had 2nd moderna vaccine 2 weeks ago. Pt stated last night he became very shaky and temp was elevated. Pt took aleve. This evening pt stated that he had shaking but he did not take his temp. Pt stated 3 aleve tablets about 2 hours ago. Pt stated he is here tonight to see a doctor because he has never had these symptoms before.   Amy Navarro RN on 3/24/2021 at 8:26 PM

## 2021-03-25 NOTE — ANESTHESIA POSTPROCEDURE EVALUATION
Patient: Nico Coello    Procedure(s):  CHOLECYSTECTOMY, LAPAROSCOPIC, PRIMARY UMILICAL HERNIA REPAIR    Diagnosis:Acute cholecystitis [K81.0]  Diagnosis Additional Information: No value filed.    Anesthesia Type:  General    Note:  Disposition: Admission   Postop Pain Control: Uneventful            Sign Out: Well controlled pain   PONV: No   Neuro/Psych:             Events: Altered mental status   Airway/Respiratory:             Events: PACU Reintubation            Sign Out: AIRWAY IN SITU/Resp. Support   CV/Hemodynamics: Uneventful            Sign Out: Acceptable CV status   Other NRE:    DID A NON-ROUTINE EVENT OCCUR? YES    Event details/Postop Comments:  Patient responding and ventilating adequately on arrival to the PACU. Pt complained of nausea, received ondansetron, shortly after patient became apneic with decreased mental status. Bag mask ventilation was initiated with quick improvement in pulse oximetry, lowest reading was 55 and lasted for about 30 seconds. Narcan was given in 0.04 mg doses for a total of 0.12mg with no improvement in mental status. Patient did not gag with deep suctioning and intubation was performed to protect airway and provide ventilation. Propofol infusion was started. Recent blood glucose was 100, CBC and BMP were drawn. EKG, and non con head CT were ordered. Bedside TTE showed normal LV, RV, MV and AV, no clot in the LA or RA, no quantitative measurements were obtained. TTE performed and interpreted by Dr. Chris Behrens.     Heat CT showed no acute intracranial pathology. Pt transferred to ICU.     Chris Behrens M.D.  Dept. Of Anesthesiology         Last vitals:  Vitals:    03/25/21 1130 03/25/21 1145 03/25/21 1200   BP: (!) 166/88 (!) 137/96 107/65   Pulse: 89 106 94   Resp:   (P) 14   Temp: 36  C (96.8  F)     SpO2: 95% 100% 99%       Last vitals prior to Anesthesia Care Transfer:  CRNA VITALS  3/25/2021 1020 - 3/25/2021 1120      3/25/2021             NIBP:  100/60    Pulse:   80    NIBP Mean:  75    Ht Rate:  80    SpO2:  98 %          Electronically Signed By: Christopher J. Behrens, MD  March 25, 2021  12:15 PM

## 2021-03-26 LAB
ALBUMIN SERPL-MCNC: 2.1 G/DL (ref 3.4–5)
ALP SERPL-CCNC: 87 U/L (ref 40–150)
ALT SERPL W P-5'-P-CCNC: 33 U/L (ref 0–70)
ANION GAP SERPL CALCULATED.3IONS-SCNC: 6 MMOL/L (ref 3–14)
AST SERPL W P-5'-P-CCNC: 38 U/L (ref 0–45)
BACTERIA SPEC CULT: NO GROWTH
BASE DEFICIT BLDA-SCNC: 3.4 MMOL/L
BASOPHILS # BLD AUTO: 0 10E9/L (ref 0–0.2)
BASOPHILS NFR BLD AUTO: 0.1 %
BILIRUB SERPL-MCNC: 0.8 MG/DL (ref 0.2–1.3)
BUN SERPL-MCNC: 34 MG/DL (ref 7–30)
CA-I BLD-MCNC: 4.6 MG/DL (ref 4.4–5.2)
CALCIUM SERPL-MCNC: 8 MG/DL (ref 8.5–10.1)
CHLORIDE SERPL-SCNC: 111 MMOL/L (ref 94–109)
CO2 SERPL-SCNC: 21 MMOL/L (ref 20–32)
CREAT SERPL-MCNC: 1.76 MG/DL (ref 0.66–1.25)
DIFFERENTIAL METHOD BLD: ABNORMAL
EOSINOPHIL # BLD AUTO: 0 10E9/L (ref 0–0.7)
EOSINOPHIL NFR BLD AUTO: 0 %
ERYTHROCYTE [DISTWIDTH] IN BLOOD BY AUTOMATED COUNT: 18.4 % (ref 10–15)
GFR SERPL CREATININE-BSD FRML MDRD: 42 ML/MIN/{1.73_M2}
GLUCOSE BLDC GLUCOMTR-MCNC: 116 MG/DL (ref 70–99)
GLUCOSE BLDC GLUCOMTR-MCNC: 121 MG/DL (ref 70–99)
GLUCOSE BLDC GLUCOMTR-MCNC: 124 MG/DL (ref 70–99)
GLUCOSE BLDC GLUCOMTR-MCNC: 127 MG/DL (ref 70–99)
GLUCOSE BLDC GLUCOMTR-MCNC: 136 MG/DL (ref 70–99)
GLUCOSE BLDC GLUCOMTR-MCNC: 146 MG/DL (ref 70–99)
GLUCOSE SERPL-MCNC: 124 MG/DL (ref 70–99)
HCO3 BLD-SCNC: 22 MMOL/L (ref 21–28)
HCT VFR BLD AUTO: 24.9 % (ref 40–53)
HGB BLD-MCNC: 7.6 G/DL (ref 13.3–17.7)
HGB BLD-MCNC: 8.4 G/DL (ref 13.3–17.7)
IMM GRANULOCYTES # BLD: 0.1 10E9/L (ref 0–0.4)
IMM GRANULOCYTES NFR BLD: 0.6 %
LYMPHOCYTES # BLD AUTO: 0.8 10E9/L (ref 0.8–5.3)
LYMPHOCYTES NFR BLD AUTO: 4.4 %
Lab: NORMAL
MAGNESIUM SERPL-MCNC: 2.1 MG/DL (ref 1.6–2.3)
MCH RBC QN AUTO: 26.5 PG (ref 26.5–33)
MCHC RBC AUTO-ENTMCNC: 30.5 G/DL (ref 31.5–36.5)
MCV RBC AUTO: 87 FL (ref 78–100)
MONOCYTES # BLD AUTO: 0.6 10E9/L (ref 0–1.3)
MONOCYTES NFR BLD AUTO: 3.3 %
NEUTROPHILS # BLD AUTO: 16 10E9/L (ref 1.6–8.3)
NEUTROPHILS NFR BLD AUTO: 91.6 %
NRBC # BLD AUTO: 0 10*3/UL
NRBC BLD AUTO-RTO: 0 /100
O2/TOTAL GAS SETTING VFR VENT: 50 %
PCO2 BLD: 39 MM HG (ref 35–45)
PH BLD: 7.36 PH (ref 7.35–7.45)
PHOSPHATE SERPL-MCNC: 5.3 MG/DL (ref 2.5–4.5)
PLATELET # BLD AUTO: 131 10E9/L (ref 150–450)
PO2 BLD: 196 MM HG (ref 80–105)
POTASSIUM SERPL-SCNC: 4.2 MMOL/L (ref 3.4–5.3)
PROT SERPL-MCNC: 6.6 G/DL (ref 6.8–8.8)
RBC # BLD AUTO: 2.87 10E12/L (ref 4.4–5.9)
SODIUM SERPL-SCNC: 139 MMOL/L (ref 133–144)
SPECIMEN SOURCE: NORMAL
WBC # BLD AUTO: 17.5 10E9/L (ref 4–11)

## 2021-03-26 PROCEDURE — 82803 BLOOD GASES ANY COMBINATION: CPT | Performed by: STUDENT IN AN ORGANIZED HEALTH CARE EDUCATION/TRAINING PROGRAM

## 2021-03-26 PROCEDURE — 82330 ASSAY OF CALCIUM: CPT | Performed by: STUDENT IN AN ORGANIZED HEALTH CARE EDUCATION/TRAINING PROGRAM

## 2021-03-26 PROCEDURE — 258N000003 HC RX IP 258 OP 636: Performed by: SURGERY

## 2021-03-26 PROCEDURE — 999N000157 HC STATISTIC RCP TIME EA 10 MIN

## 2021-03-26 PROCEDURE — 999N001017 HC STATISTIC GLUCOSE BY METER IP

## 2021-03-26 PROCEDURE — 83735 ASSAY OF MAGNESIUM: CPT | Performed by: STUDENT IN AN ORGANIZED HEALTH CARE EDUCATION/TRAINING PROGRAM

## 2021-03-26 PROCEDURE — 250N000013 HC RX MED GY IP 250 OP 250 PS 637: Performed by: SURGERY

## 2021-03-26 PROCEDURE — 84100 ASSAY OF PHOSPHORUS: CPT | Performed by: STUDENT IN AN ORGANIZED HEALTH CARE EDUCATION/TRAINING PROGRAM

## 2021-03-26 PROCEDURE — 94003 VENT MGMT INPAT SUBQ DAY: CPT

## 2021-03-26 PROCEDURE — 250N000013 HC RX MED GY IP 250 OP 250 PS 637

## 2021-03-26 PROCEDURE — 250N000011 HC RX IP 250 OP 636

## 2021-03-26 PROCEDURE — 36415 COLL VENOUS BLD VENIPUNCTURE: CPT | Performed by: SURGERY

## 2021-03-26 PROCEDURE — 85025 COMPLETE CBC W/AUTO DIFF WBC: CPT | Performed by: STUDENT IN AN ORGANIZED HEALTH CARE EDUCATION/TRAINING PROGRAM

## 2021-03-26 PROCEDURE — 94799 UNLISTED PULMONARY SVC/PX: CPT

## 2021-03-26 PROCEDURE — 99232 SBSQ HOSP IP/OBS MODERATE 35: CPT | Performed by: SURGERY

## 2021-03-26 PROCEDURE — 250N000011 HC RX IP 250 OP 636: Performed by: STUDENT IN AN ORGANIZED HEALTH CARE EDUCATION/TRAINING PROGRAM

## 2021-03-26 PROCEDURE — 999N000015 HC STATISTIC ARTERIAL MONITORING DAILY

## 2021-03-26 PROCEDURE — 85018 HEMOGLOBIN: CPT | Performed by: SURGERY

## 2021-03-26 PROCEDURE — 94667 MNPJ CHEST WALL 1ST: CPT

## 2021-03-26 PROCEDURE — 120N000002 HC R&B MED SURG/OB UMMC

## 2021-03-26 PROCEDURE — 87040 BLOOD CULTURE FOR BACTERIA: CPT | Performed by: SURGERY

## 2021-03-26 PROCEDURE — 80053 COMPREHEN METABOLIC PANEL: CPT | Performed by: STUDENT IN AN ORGANIZED HEALTH CARE EDUCATION/TRAINING PROGRAM

## 2021-03-26 RX ORDER — CEFTRIAXONE 1 G/1
1 INJECTION, POWDER, FOR SOLUTION INTRAMUSCULAR; INTRAVENOUS EVERY 24 HOURS
Status: DISCONTINUED | OUTPATIENT
Start: 2021-03-26 | End: 2021-03-29 | Stop reason: HOSPADM

## 2021-03-26 RX ORDER — AMOXICILLIN 250 MG
1-2 CAPSULE ORAL 2 TIMES DAILY
Status: DISCONTINUED | OUTPATIENT
Start: 2021-03-26 | End: 2021-03-29 | Stop reason: HOSPADM

## 2021-03-26 RX ORDER — HEPARIN SODIUM 5000 [USP'U]/.5ML
5000 INJECTION, SOLUTION INTRAVENOUS; SUBCUTANEOUS EVERY 8 HOURS
Status: DISCONTINUED | OUTPATIENT
Start: 2021-03-26 | End: 2021-03-29 | Stop reason: HOSPADM

## 2021-03-26 RX ADMIN — DOCUSATE SODIUM 50MG AND SENNOSIDES 8.6MG 1 TABLET: 8.6; 5 TABLET, FILM COATED ORAL at 09:54

## 2021-03-26 RX ADMIN — HYDROMORPHONE HYDROCHLORIDE 0.2 MG: 0.2 INJECTION, SOLUTION INTRAMUSCULAR; INTRAVENOUS; SUBCUTANEOUS at 04:34

## 2021-03-26 RX ADMIN — HYDROMORPHONE HYDROCHLORIDE 0.2 MG: 0.2 INJECTION, SOLUTION INTRAMUSCULAR; INTRAVENOUS; SUBCUTANEOUS at 14:02

## 2021-03-26 RX ADMIN — PIPERACILLIN SODIUM AND TAZOBACTAM SODIUM 3.38 G: 3; .375 INJECTION, POWDER, LYOPHILIZED, FOR SOLUTION INTRAVENOUS at 07:57

## 2021-03-26 RX ADMIN — DOCUSATE SODIUM 50 MG AND SENNOSIDES 8.6 MG 1 TABLET: 8.6; 5 TABLET, FILM COATED ORAL at 20:09

## 2021-03-26 RX ADMIN — CEFTRIAXONE 1 G: 1 INJECTION, POWDER, FOR SOLUTION INTRAMUSCULAR; INTRAVENOUS at 15:26

## 2021-03-26 RX ADMIN — PIPERACILLIN SODIUM AND TAZOBACTAM SODIUM 3.38 G: 3; .375 INJECTION, POWDER, LYOPHILIZED, FOR SOLUTION INTRAVENOUS at 02:01

## 2021-03-26 RX ADMIN — HYDROMORPHONE HYDROCHLORIDE 0.2 MG: 0.2 INJECTION, SOLUTION INTRAMUSCULAR; INTRAVENOUS; SUBCUTANEOUS at 19:22

## 2021-03-26 RX ADMIN — OXYCODONE HYDROCHLORIDE 10 MG: 5 TABLET ORAL at 15:44

## 2021-03-26 RX ADMIN — OXYCODONE HYDROCHLORIDE 5 MG: 5 TABLET ORAL at 10:23

## 2021-03-26 RX ADMIN — OXYCODONE HYDROCHLORIDE 10 MG: 5 TABLET ORAL at 20:07

## 2021-03-26 RX ADMIN — HEPARIN SODIUM 5000 UNITS: 5000 INJECTION, SOLUTION INTRAVENOUS; SUBCUTANEOUS at 21:54

## 2021-03-26 RX ADMIN — SODIUM CHLORIDE: 9 INJECTION, SOLUTION INTRAVENOUS at 05:04

## 2021-03-26 RX ADMIN — HEPARIN SODIUM 5000 UNITS: 5000 INJECTION, SOLUTION INTRAVENOUS; SUBCUTANEOUS at 14:09

## 2021-03-26 RX ADMIN — HYDROMORPHONE HYDROCHLORIDE 0.2 MG: 0.2 INJECTION, SOLUTION INTRAMUSCULAR; INTRAVENOUS; SUBCUTANEOUS at 03:58

## 2021-03-26 RX ADMIN — METRONIDAZOLE 500 MG: 500 INJECTION, SOLUTION INTRAVENOUS at 14:05

## 2021-03-26 RX ADMIN — HYDROMORPHONE HYDROCHLORIDE 0.2 MG: 0.2 INJECTION, SOLUTION INTRAMUSCULAR; INTRAVENOUS; SUBCUTANEOUS at 21:53

## 2021-03-26 RX ADMIN — ACETAMINOPHEN 975 MG: 325 TABLET ORAL at 15:44

## 2021-03-26 RX ADMIN — DOCUSATE SODIUM 100 MG: 100 CAPSULE, LIQUID FILLED ORAL at 09:54

## 2021-03-26 RX ADMIN — METRONIDAZOLE 500 MG: 500 INJECTION, SOLUTION INTRAVENOUS at 21:53

## 2021-03-26 RX ADMIN — ACETAMINOPHEN 975 MG: 325 TABLET ORAL at 09:54

## 2021-03-26 ASSESSMENT — ACTIVITIES OF DAILY LIVING (ADL)
ADLS_ACUITY_SCORE: 16
ADLS_ACUITY_SCORE: 19
ADLS_ACUITY_SCORE: 17
ADLS_ACUITY_SCORE: 17

## 2021-03-26 ASSESSMENT — MIFFLIN-ST. JEOR: SCORE: 1794.88

## 2021-03-26 NOTE — PROGRESS NOTES
Transferred to: Unit 7B at 1230  Belongings: phone and jacket sent with patient, the rest are at security  Shah removed? Yes  Central line removed? No, okay to keep internal jugular in, a line dc'd  Chart and medications sent with patient Yes  Family notified: Yes, pt will update

## 2021-03-26 NOTE — PLAN OF CARE
Major Shift Events:  Pt able to communicate needs, appears oriented and answering questions appropriately. Fentanyl at 50mcg/hr, briefly decreased but adjusted back to 50 d/t complaint of R abdominal pain after stripping GISELE drain and reducing fentanyl rate with PRN dilaudid ineffective. Bradycardic, HR 50's most of shift, BP maintained MAP>65 without pressors. O2 sats good on vent settings, FiO2 reduced to 30% with sats in the high 90's, blood gas good this AM.    Plan: Possible extubation this morning. Continue to monitor. Will need enteral access if not extubated, none currently present.    For vital signs and complete assessments, please see documentation flowsheets.

## 2021-03-26 NOTE — PROGRESS NOTES
"Resident/Fellow Attestation   I, Amy Sapp, was present with the medical student who participated in the service and in the documentation of the note.  I have verified the history and personally performed the physical exam and medical decision making.  I agree with the assessment and plan of care as documented in the note.        Summary:  Patient transferred to SICU from PACU 3/25 after becoming apneic and unresponsive in PACU. Patient is awake, alert, and interactive however remains intubated at this time.   - Appreciate excellent SICU cares. If successfully extubated, can transfer to floor  - BCx growing gram (-), continue Zosyn and will follow speciation and susceptibilities  - Continue GISELE, will likely discharge with drain  - Gangrenous cholecystitis, will need course of Zosyn.      Amy Sapp MD  PGY2  Date of Service (when I saw the patient): 03/26/21        General Surgery Progress Note    Subjective: Patient able to interact by writing questions. Abdominal pain is 5/10 and constant. Off pressors and intubated. Day 3 of Zosyn.     Objective:   BP 91/58   Pulse 52   Temp 97.6  F (36.4  C) (Axillary)   Resp 16   Ht 1.651 m (5' 5\")   Wt 103.8 kg (228 lb 13.4 oz)   SpO2 98%   BMI 38.08 kg/m      PE:  Gen: Alert, in no acute distress  CV: Bradycardic and non-cyanotic appearing   Resp: non-labored breathing on mechanical ventilation  Abd: Soft, non-distended, appropriately tender, no rigidity or guarding. GISELE drain with serosanguinous drainage  Ext: warm and well perfused      Intake/Output Summary (Last 24 hours) at 3/26/2021 0522  Last data filed at 3/26/2021 0504  Gross per 24 hour   Intake 5846 ml   Output 1715 ml   Net 4131 ml     Urine output last 24 hours >0.5 ml/kg/hr    Last Comprehensive Metabolic Panel:  Sodium   Date Value Ref Range Status   03/26/2021 139 133 - 144 mmol/L Final     Potassium   Date Value Ref Range Status   03/26/2021 4.2 3.4 - 5.3 mmol/L Final     Chloride "   Date Value Ref Range Status   03/26/2021 111 (H) 94 - 109 mmol/L Final     Carbon Dioxide   Date Value Ref Range Status   03/26/2021 21 20 - 32 mmol/L Final     Anion Gap   Date Value Ref Range Status   03/26/2021 6 3 - 14 mmol/L Final     Glucose   Date Value Ref Range Status   03/26/2021 124 (H) 70 - 99 mg/dL Final     Urea Nitrogen   Date Value Ref Range Status   03/26/2021 34 (H) 7 - 30 mg/dL Final     Creatinine   Date Value Ref Range Status   03/26/2021 1.76 (H) 0.66 - 1.25 mg/dL Final     GFR Estimate   Date Value Ref Range Status   03/26/2021 42 (L) >60 mL/min/[1.73_m2] Final     Comment:     Non  GFR Calc  Starting 12/18/2018, serum creatinine based estimated GFR (eGFR) will be   calculated using the Chronic Kidney Disease Epidemiology Collaboration   (CKD-EPI) equation.       Calcium   Date Value Ref Range Status   03/26/2021 8.0 (L) 8.5 - 10.1 mg/dL Final     Lab Results   Component Value Date    WBC 17.5 03/26/2021     Lab Results   Component Value Date    RBC 2.87 03/26/2021     Lab Results   Component Value Date    HGB 7.6 03/26/2021     Lab Results   Component Value Date    HCT 24.9 03/26/2021     No components found for: MCT  Lab Results   Component Value Date    MCV 87 03/26/2021     Lab Results   Component Value Date    MCH 26.5 03/26/2021     Lab Results   Component Value Date    MCHC 30.5 03/26/2021     Lab Results   Component Value Date    RDW 18.4 03/26/2021     Lab Results   Component Value Date     03/26/2021     Fluid culture Abdominal Pus 3/24   Few gram negative rods and PMN's  Culture pending     Blood culture 3/25  Pending     CT Head 3/24  Impression:  1. No definite acute intracranial pathology.  2. Chronic lacunar infarct in the right midbrain.   3. Consider MRI if clinically indicated.    CT Chest 3/24  Impression:  1. No pulmonary embolism identified.  2. Nonvisualization of the left lower lobe bronchus, potentially due  to retained secretions. There is  associated collapse of the left lower  lobe.  3. Trace right pleural effusion, with mild dependent  atelectasis/consolidation. The aerated portions of the lungs appear  Clear.    CT Abdomen Pelvis 3/24  IMPRESSION:   1. Surgical changes recent laparoscopic cholecystectomy.   2. Right lateral approach abdominal drain with persistent gallbladder  fossa fluid which could represent postoperative fluid, hematoma,  and/or biloma. Correlate with drain fluid.  3. Inflammation of the hepatic flexure, increased from the  preoperative study. This is likely related to the infected gallbladder  and surgery. Keep in mind diverticulitis which seems unlikely in the  hepatic flexure.      A/P: Nico Coello is a 55 year old male with history of metabolic syndrome c/b DM type II who is POD 1 from partial cholecystectomy for gangrenous cholecystitis. He was found to be apneic in PACU for which he was intubated and sent to the ICU. His leukocytosis has decreased and blood pressure is maintained without pressors. His hemoglobin has down trended to 7.6 although he has received large quantities of IV fluids since admission. CT has ruled out intracranial pathology and pulmonary embolus as cause for change in status. POD 1 he is alert and interactive.     Plan  - Continue abx for gangrenous cholecystitis. Switch from Zosyn to ceftriaxone/flagyl (pan sensitive E coli). Continue abx based on negative Cx   - Appreciate cares per SICU   - Keep GISELE in x 1 week   - Follow-up blood and surgical cultures    Cristian Lozada MS3    Addendum     Rola Rankin MD   PGY 5

## 2021-03-26 NOTE — PROGRESS NOTES
[facility], Procedure Note          Extubation:       Nico Coello  MRN# [id number]   March 26, 2021, 8:51 AM         Patient extubated at: March 26, 2021, 8:50 AM   Supplemental Oxygen: Via nasal cannula at 4 liters per minute   Cough: The cough is good and productive   Secretion Mode: PRN suction by self   Secretion Amount: Small amount, moderately thick and white / yellow in color   Respiratory Exam:: Breath sounds: equal and clear     Location: bilaterally   Skin Exam:: Patient color: natural   Patient Status: Currently appears comfortable             Recorded by Daniela Ndiaye

## 2021-03-26 NOTE — PLAN OF CARE
[id number]  POD#1 lap lila. VSS. A&Ox4. Abd incisions CDI. GISELE with serosanguinous output. Shah removed at noon, DTV. Pt wants more time to try to void. Tolerating CLD. PIV SL'd. R internal jugular SL'd. Up with assist of 1 and walker.     Addendum- voided 300ml

## 2021-03-26 NOTE — PLAN OF CARE
"BP (!) 151/82 (BP Location: Left arm)   Pulse 65   Temp 95.9  F (35.5  C) (Axillary)   Resp 20   Ht 1.651 m (5' 5\")   Wt 103.8 kg (228 lb 13.4 oz)   SpO2 95%   BMI 38.08 kg/m    Arrived from  at 1230, VSS on room air. Lap sites x3, CDI primapore; R GISELE with serosanguinous output. Protective mepilex to coccyx, no new skin issues noted. R internal jugular triple lumen, dressing reinforced. R PIV SL. Lungs dim, Yankauer at bedside, weak cough, refuses IS. Heartbeat regular, jay at rest. Denies passing gas, BS+, LBM PTA, abdomen soft. Tolerating CLD. AC/HS BS. Pain tolerable with current meds. Will continue POC.  "

## 2021-03-26 NOTE — UTILIZATION REVIEW
"  Admission Status; Secondary Review Determination         Under the authority of the Utilization Management Committee, the utilization review process indicated a secondary review on the above patient.  The review outcome is based on review of the medical records, discussions with staff, and applying clinical experience noted on the date of the review.        (X)      Inpatient Status Appropriate - This patient's medical care is consistent with medical management for inpatient care and reasonable inpatient medical practice.      () Observation Status Appropriate - This patient does not meet hospital inpatient criteria and is placed in observation status. If this patient's primary payer is Medicare and was admitted as an inpatient, Condition Code 44 should be used and patient status changed to \"observation\".   () Admission Status NOT Appropriate - This patient's medical care is not consistent with medical management for Inpatient or Observation Status.          RATIONALE FOR DETERMINATION     55-year-old male with a PMHx s/f DM, HTN who presented on 3/24 for RUQ pain, found to have perforated cholecystitis. He is s/p laparoscopic partial cholecystectomy. Transferred to SICU after becoming apneic and non-responsive and re-intubation in PACU. His WBC was elevated, and his creatinine is elevated from his baseline.  He had presumed sepsis and is being treated with fluids, antibiotics, and propofol/ fentanyl drips in the surgical ICU.    The severity of illness, intensity of service provided, expected LOS and risk for adverse outcome make the care complex, high risk and appropriate for hospital admission.        The information on this document is developed by the utilization review team in order for the business office to ensure compliance.  This only denotes the appropriateness of proper admission status and does not reflect the quality of care rendered.         The definitions of Inpatient Status and Observation Status " used in making the determination above are those provided in the CMS Coverage Manual, Chapter 1 and Chapter 6, section 70.4.      Sincerely,     Mode Choi MD  Physician Advisor  Utilization Review/ Case Management  [facility].

## 2021-03-26 NOTE — PROGRESS NOTES
SURGICAL ICU PROGRESS NOTE  March 26, 2021      CO-MORBIDITIES:   Acute cholecystitis    ASSESSMENT: Nico Coello is a 55 year old male POD # 1 s/p laparoscopic partial cholecystectomy for perforated cholecystitis. Sent to SICU from PACU after apnea and acute AMS requiring emergent re-intubation.    TODAY'S PROGRESS/PLANS:   - PST  - Transfer to floor  - Diet  - Shah out  - A line out  - Stop fentanyl, propofol      PLAN:   Neuro/ pain/ sedation:  # AMS in PACU  - Sedation stopped  - PRN dilaudid, oxy  - AMS in PACU, no improvement with NARCAN  - CT head 3/25 negative for acute pathology     Pulmonary care:  # Re-intubation in PACU  # Extubated  - Pressure supporting this morning, extubated without difficulty  - ABG 7.36/39/196/22  - CT PE negative  - PS this morning     Cardiovascular:  # HTN    - PTA amlodipine, lisinopril held  - Bedside TTE in PACU normal  - Fluid resuscitation with crystalloid  - MAP 66-89 without pressors     GI/ Nutrition:  # S/p lap partial lila    - NPO  - LFTs, lipase wnl  - GISELE output 210     Fluids/ Electrolytes/ Renal:   -  ml/hr for IV fluid hydration  - K 4.2, Cr 1.76 (2.3 on admission, baseline 0.9)  - UOP 1130  - Net positive > 4 L since admission     Endocrine:  #DM    - Sliding scale insulin- none needed, BG   - No PTA meds     ID/ Antibiotics:  # Perforated cholecystitis  - Zosyn  - WBC 17.5 (24)  - Abdominal fluid cultures, urine cultures (3/25) pending  - Blood cultures (3/24) growing E coli in 1 of 2     Heme:  # Chronic anemia     - Hgb 7.6 (9.8)     Prophylaxis:    - Mechanical prophylaxis for DVT only per surgery     Lines/ tubes/ drains:  - PIV  - Shah- remove today  - R internal jugular triple lumen CVC (removal per surgery)  - R axillary arterial line- remove today     Disposition:  - Surgical ICU    ====================================    SUBJECTIVE:   Alert this morning, extubated without issue. Pain well controlled, no nausea.       OBJECTIVE:   1.  VITAL SIGNS:   Temp:  [97.5  F (36.4  C)-98.7  F (37.1  C)] 98.7  F (37.1  C)  Pulse:  [] 62  Resp:  [12-21] 20  BP: ()/(56-76) 91/58  MAP:  [11 mmHg-104 mmHg] 103 mmHg  Arterial Line BP: ()/(11-78) 145/76  FiO2 (%):  [30 %-60 %] 30 %  SpO2:  [90 %-100 %] 99 %  Ventilation Mode: (S) CPAP/PS  (Continuous positive airway pressure with Pressure Support)  FiO2 (%): 30 %  Rate Set (breaths/minute): 12 breaths/min  Tidal Volume Set (mL): 500 mL  PEEP (cm H2O): 5 cmH2O  Pressure Support (cm H2O): 7 cmH2O  Oxygen Concentration (%): 30 %  Resp: 20      2. INTAKE/ OUTPUT:   I/O last 3 completed shifts:  In: 6160.47 [I.V.:4160.47; IV Piggyback:2000]  Out: 1850 [Urine:1535; Drains:265; Blood:50]    3. PHYSICAL EXAMINATION:   General: alert, comfortable  Neuro: following all commands, intact ROM  Resp: non labored breathing on 1 LPM  CV: RRR  Abdomen: Soft, Non-distended, appropriately tender, GISELE with small amount of bloody output  Incisions: dressed without strikethrough  Extremities: warm and well perfused    4. INVESTIGATIONS:   Arterial Blood Gases   Recent Labs   Lab 03/26/21  0335 03/25/21  1430   PH 7.36 7.31*   PCO2 39 44   PO2 196* 134*   HCO3 22 22     Complete Blood Count   Recent Labs   Lab 03/26/21  0335 03/25/21  1200 03/25/21  0700 03/24/21  2133   WBC 17.5* 24.2* 18.3* 26.8*   HGB 7.6* 9.8* 10.3* 10.8*   * 160 176 223     Basic Metabolic Panel  Recent Labs   Lab 03/26/21  0335 03/25/21  1200 03/25/21  0700 03/24/21  2133    139 138 136   POTASSIUM 4.2 4.1 3.6 3.7   CHLORIDE 111* 109 108 103   CO2 21 23 22 25   BUN 34* 35* 33* 30   CR 1.76* 1.88* 1.92* 2.31*   * 122* 86 105*     Liver Function Tests  Recent Labs   Lab 03/26/21  0335 03/25/21  0700 03/24/21  2133   AST 38 37 36   ALT 33 24 27   ALKPHOS 87 125 119   BILITOTAL 0.8 1.4* 1.3   ALBUMIN 2.1* 2.9* 3.4     Pancreatic Enzymes  Recent Labs   Lab 03/25/21  0700   LIPASE 75     Coagulation Profile  No lab results found in  last 7 days.  Lactate  Invalid input(s): LACTATE    5. RADIOLOGY:   Recent Results (from the past 24 hour(s))   POC US Guidance Needle Placement    Impression    TAP block    CT Head w/o Contrast    Narrative    CT HEAD W/O CONTRAST 3/25/2021 1:15 PM    History: Mental status change, unknown cause; S/p lap lila, became  apneic/unresponsive   ICD-10:    Comparison: None available    Technique: Using multidetector thin collimation helical acquisition  technique, axial, coronal and sagittal CT images from the skull base  to the vertex were obtained without intravenous contrast.    Findings: There is a focal hypodensity in the right midbrain which  appears chronic. There is no intracranial hemorrhage, mass effect, or  midline shift. Gray/white matter differentiation in both cerebral  hemispheres is preserved. Ventricles are proportionate to the cerebral  sulci. The basal cisterns are clear.    The bony calvaria and the bones of the skull base are normal. The  visualized portions of the paranasal sinuses and mastoid air cells are  clear.       Impression    Impression:    1. No definite acute intracranial pathology.  2. Chronic lacunar infarct in the right midbrain.   3. Consider MRI if clinically indicated..    NEELAM GRACE MD   XR Chest Port 1 View    Narrative    Exam: XR CHEST PORT 1 VW, 3/25/2021 4:49 PM    Indication: evaluate IJ line placement    Comparison: 3/24/2021    Findings:   Single portable view the chest. Right IJ central venous catheter  terminating in the right atrium. Endotracheal tube projecting  approximately 3 cm above the milagros. Right-sided mid line projecting  over the right axilla. Cardiac mediastinum and upper abdomen are  unchanged. No pneumothorax. Small left pleural effusion. Low lung  volumes with hazy opacities. Silhouetting of the left hemidiaphragm.      Impression    Impression:   1. Right IJ central venous catheter projecting over the right atrium.  Other support devices as  listed.  2. Small left pleural effusion.  3. Low lung volumes with hazy opacities suggesting atelectasis.  Silhouetting of the left hemidiaphragm may be atelectasis although  small effusion may also be present    I have personally reviewed the examination and initial interpretation  and I agree with the findings.    NGHIA WATTS MD   CT Chest Pulmonary Embolism w Contrast    Narrative    PRELIMINARY REPORT - The following report is a preliminary  interpretation.      Impression    Impression:  1. No pulmonary embolism identified.  2. Nonvisualization of the left lower lobe bronchus, potentially due  to retained secretions. There is associated collapse of the left lower  lobe.  3. Trace right pleural effusion, with mild dependent  atelectasis/consolidation. The aerated portions of the lungs appear  clear.   CT Abdomen Pelvis w Contrast    Narrative    EXAMINATION: CT ABDOMEN PELVIS W CONTRAST, 3/25/2021 5:18 PM    TECHNIQUE:  Helical CT images from the lung bases through the  symphysis pubis were obtained 6 IV contrast. Contrast dose: iopamidol  (ISOVUE-370) solution 135 mL    COMPARISON: 3/24/2021    HISTORY: None available    FINDINGS:    Lung bases: Bibasilar atelectasis versus consolidation.    Abdomen and pelvis: Stable hypodensity of segment IVb favoring hepatic  cyst (series 2 image 168). Right approach abdominal drain terminating  medial to the left lobe of the liver. Small subcutaneous emphysema of  the right abdominal wall. Normal spleen, pancreas, and adrenal glands.  Status post recent cholecystectomy. Persistent fluid density within  the gallbladder fossa and mesenteric fat stranding extending medially  and inferiorly (series 2 image 179). Hepatic flexure of the colon has  thick walls and inflammation surrounding it. Increased when compared  to the preoperative study.     Normal kidneys. No dilation of the urinary collecting system. No  pelvic mass. Urinary catheter in place. Small pelvic free  fluid.  Diverticulosis without evidence of diverticulitis.    Bones and soft tissues: Chronic degenerative changes of the thoracal  lumbar spine. Chronic anterior rib deformities. Soft tissue changes of  the abdomen consistent with recent laparoscopic cholecystectomy.      Impression    IMPRESSION:   1. Surgical changes recent laparoscopic cholecystectomy.   2. Right lateral approach abdominal drain with persistent gallbladder  fossa fluid which could represent postoperative fluid, hematoma,  and/or biloma. Correlate with drain fluid.  3. Inflammation of the hepatic flexure, increased from the  preoperative study. This is likely related to the infected gallbladder  and surgery. Keep in mind diverticulitis which seems unlikely in the  hepatic flexure.    I have personally reviewed the examination and initial interpretation  and I agree with the findings.    NGHIA WATTS MD       =========================================      Patient seen, findings and plan discussed with surgical ICU staff.    Sony Luna MD  Surgery PGY-2

## 2021-03-27 ENCOUNTER — ANESTHESIA (OUTPATIENT)
Dept: SURGERY | Facility: CLINIC | Age: 56
End: 2021-03-27
Payer: COMMERCIAL

## 2021-03-27 ENCOUNTER — ANESTHESIA EVENT (OUTPATIENT)
Dept: SURGERY | Facility: CLINIC | Age: 56
End: 2021-03-27
Payer: COMMERCIAL

## 2021-03-27 ENCOUNTER — APPOINTMENT (OUTPATIENT)
Dept: GENERAL RADIOLOGY | Facility: CLINIC | Age: 56
End: 2021-03-27
Attending: SURGERY
Payer: COMMERCIAL

## 2021-03-27 LAB
ALBUMIN SERPL-MCNC: 2.6 G/DL (ref 3.4–5)
ALP SERPL-CCNC: 106 U/L (ref 40–150)
ALT SERPL W P-5'-P-CCNC: 29 U/L (ref 0–70)
ANION GAP SERPL CALCULATED.3IONS-SCNC: 6 MMOL/L (ref 3–14)
AST SERPL W P-5'-P-CCNC: 34 U/L (ref 0–45)
BACTERIA SPEC CULT: ABNORMAL
BILIRUB SERPL-MCNC: 0.6 MG/DL (ref 0.2–1.3)
BUN SERPL-MCNC: 33 MG/DL (ref 7–30)
CALCIUM SERPL-MCNC: 8.9 MG/DL (ref 8.5–10.1)
CHLORIDE SERPL-SCNC: 112 MMOL/L (ref 94–109)
CO2 SERPL-SCNC: 23 MMOL/L (ref 20–32)
CREAT SERPL-MCNC: 1.34 MG/DL (ref 0.66–1.25)
ERYTHROCYTE [DISTWIDTH] IN BLOOD BY AUTOMATED COUNT: 18.5 % (ref 10–15)
GFR SERPL CREATININE-BSD FRML MDRD: 59 ML/MIN/{1.73_M2}
GLUCOSE BLDC GLUCOMTR-MCNC: 100 MG/DL (ref 70–99)
GLUCOSE BLDC GLUCOMTR-MCNC: 106 MG/DL (ref 70–99)
GLUCOSE BLDC GLUCOMTR-MCNC: 109 MG/DL (ref 70–99)
GLUCOSE BLDC GLUCOMTR-MCNC: 114 MG/DL (ref 70–99)
GLUCOSE BLDC GLUCOMTR-MCNC: 83 MG/DL (ref 70–99)
GLUCOSE SERPL-MCNC: 103 MG/DL (ref 70–99)
HCT VFR BLD AUTO: 30.5 % (ref 40–53)
HGB BLD-MCNC: 9.2 G/DL (ref 13.3–17.7)
MCH RBC QN AUTO: 26.5 PG (ref 26.5–33)
MCHC RBC AUTO-ENTMCNC: 30.2 G/DL (ref 31.5–36.5)
MCV RBC AUTO: 88 FL (ref 78–100)
PLATELET # BLD AUTO: 192 10E9/L (ref 150–450)
POTASSIUM SERPL-SCNC: 4 MMOL/L (ref 3.4–5.3)
PROT SERPL-MCNC: 7.8 G/DL (ref 6.8–8.8)
RBC # BLD AUTO: 3.47 10E12/L (ref 4.4–5.9)
SODIUM SERPL-SCNC: 140 MMOL/L (ref 133–144)
SPECIMEN SOURCE: ABNORMAL
SPECIMEN SOURCE: ABNORMAL
WBC # BLD AUTO: 22.1 10E9/L (ref 4–11)

## 2021-03-27 PROCEDURE — 258N000003 HC RX IP 258 OP 636: Performed by: SURGERY

## 2021-03-27 PROCEDURE — 250N000011 HC RX IP 250 OP 636: Performed by: STUDENT IN AN ORGANIZED HEALTH CARE EDUCATION/TRAINING PROGRAM

## 2021-03-27 PROCEDURE — 999N000141 HC STATISTIC PRE-PROCEDURE NURSING ASSESSMENT: Performed by: INTERNAL MEDICINE

## 2021-03-27 PROCEDURE — 0F798DZ DILATION OF COMMON BILE DUCT WITH INTRALUMINAL DEVICE, VIA NATURAL OR ARTIFICIAL OPENING ENDOSCOPIC: ICD-10-PCS | Performed by: INTERNAL MEDICINE

## 2021-03-27 PROCEDURE — 258N000003 HC RX IP 258 OP 636: Performed by: NURSE ANESTHETIST, CERTIFIED REGISTERED

## 2021-03-27 PROCEDURE — 80053 COMPREHEN METABOLIC PANEL: CPT | Performed by: SURGERY

## 2021-03-27 PROCEDURE — 0F7D8DZ DILATION OF PANCREATIC DUCT WITH INTRALUMINAL DEVICE, VIA NATURAL OR ARTIFICIAL OPENING ENDOSCOPIC: ICD-10-PCS | Performed by: INTERNAL MEDICINE

## 2021-03-27 PROCEDURE — 999N000181 XR SURGERY CARM FLUORO GREATER THAN 5 MIN W STILLS: Mod: TC

## 2021-03-27 PROCEDURE — 250N000011 HC RX IP 250 OP 636

## 2021-03-27 PROCEDURE — C1769 GUIDE WIRE: HCPCS | Performed by: INTERNAL MEDICINE

## 2021-03-27 PROCEDURE — 250N000013 HC RX MED GY IP 250 OP 250 PS 637

## 2021-03-27 PROCEDURE — 87040 BLOOD CULTURE FOR BACTERIA: CPT | Performed by: SURGERY

## 2021-03-27 PROCEDURE — 250N000009 HC RX 250: Performed by: NURSE ANESTHETIST, CERTIFIED REGISTERED

## 2021-03-27 PROCEDURE — 85027 COMPLETE CBC AUTOMATED: CPT | Performed by: SURGERY

## 2021-03-27 PROCEDURE — 272N000001 HC OR GENERAL SUPPLY STERILE: Performed by: INTERNAL MEDICINE

## 2021-03-27 PROCEDURE — 370N000017 HC ANESTHESIA TECHNICAL FEE, PER MIN: Performed by: INTERNAL MEDICINE

## 2021-03-27 PROCEDURE — 255N000002 HC RX 255 OP 636: Performed by: INTERNAL MEDICINE

## 2021-03-27 PROCEDURE — 250N000011 HC RX IP 250 OP 636: Performed by: ANESTHESIOLOGY

## 2021-03-27 PROCEDURE — 36592 COLLECT BLOOD FROM PICC: CPT | Performed by: SURGERY

## 2021-03-27 PROCEDURE — 999N001017 HC STATISTIC GLUCOSE BY METER IP

## 2021-03-27 PROCEDURE — 120N000002 HC R&B MED SURG/OB UMMC

## 2021-03-27 PROCEDURE — 99222 1ST HOSP IP/OBS MODERATE 55: CPT | Mod: 25 | Performed by: INTERNAL MEDICINE

## 2021-03-27 PROCEDURE — 250N000011 HC RX IP 250 OP 636: Performed by: NURSE ANESTHETIST, CERTIFIED REGISTERED

## 2021-03-27 PROCEDURE — 0F7C8ZZ DILATION OF AMPULLA OF VATER, VIA NATURAL OR ARTIFICIAL OPENING ENDOSCOPIC: ICD-10-PCS | Performed by: INTERNAL MEDICINE

## 2021-03-27 PROCEDURE — 250N000025 HC SEVOFLURANE, PER MIN: Performed by: INTERNAL MEDICINE

## 2021-03-27 PROCEDURE — 250N000009 HC RX 250: Performed by: INTERNAL MEDICINE

## 2021-03-27 PROCEDURE — C1877 STENT, NON-COAT/COV W/O DEL: HCPCS | Performed by: INTERNAL MEDICINE

## 2021-03-27 PROCEDURE — 710N000010 HC RECOVERY PHASE 1, LEVEL 2, PER MIN: Performed by: INTERNAL MEDICINE

## 2021-03-27 PROCEDURE — 250N000013 HC RX MED GY IP 250 OP 250 PS 637: Performed by: SURGERY

## 2021-03-27 PROCEDURE — 360N000082 HC SURGERY LEVEL 2 W/ FLUORO, PER MIN: Performed by: INTERNAL MEDICINE

## 2021-03-27 PROCEDURE — C1726 CATH, BAL DIL, NON-VASCULAR: HCPCS | Performed by: INTERNAL MEDICINE

## 2021-03-27 DEVICE — IMPLANTABLE DEVICE
Type: IMPLANTABLE DEVICE | Site: BILE DUCT | Status: NON-FUNCTIONAL
Removed: 2021-05-12

## 2021-03-27 DEVICE — STENT FREEMAN PANCREA FLEX 4FRX11CM W/O FLANGE SGL PIGTAIL
Type: IMPLANTABLE DEVICE | Site: PANCREATIC DUCT | Status: NON-FUNCTIONAL
Removed: 2021-05-12

## 2021-03-27 RX ORDER — IOPAMIDOL 510 MG/ML
INJECTION, SOLUTION INTRAVASCULAR PRN
Status: DISCONTINUED | OUTPATIENT
Start: 2021-03-27 | End: 2021-03-27 | Stop reason: HOSPADM

## 2021-03-27 RX ORDER — SODIUM CHLORIDE, SODIUM LACTATE, POTASSIUM CHLORIDE, CALCIUM CHLORIDE 600; 310; 30; 20 MG/100ML; MG/100ML; MG/100ML; MG/100ML
INJECTION, SOLUTION INTRAVENOUS CONTINUOUS PRN
Status: DISCONTINUED | OUTPATIENT
Start: 2021-03-27 | End: 2021-03-27

## 2021-03-27 RX ORDER — ONDANSETRON 2 MG/ML
4 INJECTION INTRAMUSCULAR; INTRAVENOUS EVERY 30 MIN PRN
Status: DISCONTINUED | OUTPATIENT
Start: 2021-03-27 | End: 2021-03-27 | Stop reason: HOSPADM

## 2021-03-27 RX ORDER — EPHEDRINE SULFATE 50 MG/ML
INJECTION, SOLUTION INTRAMUSCULAR; INTRAVENOUS; SUBCUTANEOUS PRN
Status: DISCONTINUED | OUTPATIENT
Start: 2021-03-27 | End: 2021-03-27

## 2021-03-27 RX ORDER — FENTANYL CITRATE 50 UG/ML
25-50 INJECTION, SOLUTION INTRAMUSCULAR; INTRAVENOUS
Status: DISCONTINUED | OUTPATIENT
Start: 2021-03-27 | End: 2021-03-27 | Stop reason: HOSPADM

## 2021-03-27 RX ORDER — DEXTROSE MONOHYDRATE, SODIUM CHLORIDE, AND POTASSIUM CHLORIDE 50; 1.49; 4.5 G/1000ML; G/1000ML; G/1000ML
INJECTION, SOLUTION INTRAVENOUS CONTINUOUS
Status: DISCONTINUED | OUTPATIENT
Start: 2021-03-27 | End: 2021-03-28

## 2021-03-27 RX ORDER — INDOMETHACIN 50 MG/1
100 SUPPOSITORY RECTAL
Status: DISCONTINUED | OUTPATIENT
Start: 2021-03-27 | End: 2021-03-27 | Stop reason: HOSPADM

## 2021-03-27 RX ORDER — SODIUM CHLORIDE, SODIUM LACTATE, POTASSIUM CHLORIDE, CALCIUM CHLORIDE 600; 310; 30; 20 MG/100ML; MG/100ML; MG/100ML; MG/100ML
INJECTION, SOLUTION INTRAVENOUS CONTINUOUS
Status: DISCONTINUED | OUTPATIENT
Start: 2021-03-27 | End: 2021-03-27 | Stop reason: HOSPADM

## 2021-03-27 RX ORDER — INDOMETHACIN 50 MG/1
SUPPOSITORY RECTAL PRN
Status: DISCONTINUED | OUTPATIENT
Start: 2021-03-27 | End: 2021-03-27 | Stop reason: HOSPADM

## 2021-03-27 RX ORDER — PROPOFOL 10 MG/ML
INJECTION, EMULSION INTRAVENOUS PRN
Status: DISCONTINUED | OUTPATIENT
Start: 2021-03-27 | End: 2021-03-27

## 2021-03-27 RX ORDER — ONDANSETRON 4 MG/1
4 TABLET, ORALLY DISINTEGRATING ORAL EVERY 30 MIN PRN
Status: DISCONTINUED | OUTPATIENT
Start: 2021-03-27 | End: 2021-03-27 | Stop reason: HOSPADM

## 2021-03-27 RX ORDER — LIDOCAINE HYDROCHLORIDE 20 MG/ML
INJECTION, SOLUTION INFILTRATION; PERINEURAL PRN
Status: DISCONTINUED | OUTPATIENT
Start: 2021-03-27 | End: 2021-03-27

## 2021-03-27 RX ORDER — LEVOFLOXACIN 5 MG/ML
INJECTION, SOLUTION INTRAVENOUS PRN
Status: DISCONTINUED | OUTPATIENT
Start: 2021-03-27 | End: 2021-03-27

## 2021-03-27 RX ORDER — LIDOCAINE 40 MG/G
CREAM TOPICAL
Status: DISCONTINUED | OUTPATIENT
Start: 2021-03-27 | End: 2021-03-27 | Stop reason: HOSPADM

## 2021-03-27 RX ORDER — HYDRALAZINE HYDROCHLORIDE 20 MG/ML
10 INJECTION INTRAMUSCULAR; INTRAVENOUS EVERY 10 MIN PRN
Status: DISCONTINUED | OUTPATIENT
Start: 2021-03-27 | End: 2021-03-29 | Stop reason: HOSPADM

## 2021-03-27 RX ORDER — FENTANYL CITRATE 50 UG/ML
INJECTION, SOLUTION INTRAMUSCULAR; INTRAVENOUS PRN
Status: DISCONTINUED | OUTPATIENT
Start: 2021-03-27 | End: 2021-03-27

## 2021-03-27 RX ORDER — ONDANSETRON 2 MG/ML
INJECTION INTRAMUSCULAR; INTRAVENOUS PRN
Status: DISCONTINUED | OUTPATIENT
Start: 2021-03-27 | End: 2021-03-27

## 2021-03-27 RX ADMIN — FENTANYL CITRATE 50 MCG: 50 INJECTION, SOLUTION INTRAMUSCULAR; INTRAVENOUS at 17:10

## 2021-03-27 RX ADMIN — ROCURONIUM BROMIDE 10 MG: 10 INJECTION INTRAVENOUS at 18:25

## 2021-03-27 RX ADMIN — LIDOCAINE HYDROCHLORIDE 100 MG: 20 INJECTION, SOLUTION INFILTRATION; PERINEURAL at 17:10

## 2021-03-27 RX ADMIN — GLUCAGON HYDROCHLORIDE 0.4 MG: KIT at 18:06

## 2021-03-27 RX ADMIN — HYDRALAZINE HYDROCHLORIDE 10 MG: 20 INJECTION INTRAMUSCULAR; INTRAVENOUS at 21:08

## 2021-03-27 RX ADMIN — PHENYLEPHRINE HYDROCHLORIDE 0.2 MCG/KG/MIN: 10 INJECTION INTRAVENOUS at 17:48

## 2021-03-27 RX ADMIN — GLUCAGON HYDROCHLORIDE 0.4 MG: KIT at 17:43

## 2021-03-27 RX ADMIN — HYDROMORPHONE HYDROCHLORIDE 0.2 MG: 0.2 INJECTION, SOLUTION INTRAMUSCULAR; INTRAVENOUS; SUBCUTANEOUS at 05:14

## 2021-03-27 RX ADMIN — HEPARIN SODIUM 5000 UNITS: 5000 INJECTION, SOLUTION INTRAVENOUS; SUBCUTANEOUS at 23:54

## 2021-03-27 RX ADMIN — ROCURONIUM BROMIDE 10 MG: 10 INJECTION INTRAVENOUS at 19:04

## 2021-03-27 RX ADMIN — LEVOFLOXACIN 500 MG: 5 INJECTION, SOLUTION INTRAVENOUS at 17:20

## 2021-03-27 RX ADMIN — PHENYLEPHRINE HYDROCHLORIDE 100 MCG: 10 INJECTION INTRAVENOUS at 17:46

## 2021-03-27 RX ADMIN — ROCURONIUM BROMIDE 20 MG: 10 INJECTION INTRAVENOUS at 19:10

## 2021-03-27 RX ADMIN — ROCURONIUM BROMIDE 10 MG: 10 INJECTION INTRAVENOUS at 19:57

## 2021-03-27 RX ADMIN — METRONIDAZOLE 500 MG: 500 INJECTION, SOLUTION INTRAVENOUS at 14:40

## 2021-03-27 RX ADMIN — CEFTRIAXONE 1 G: 1 INJECTION, POWDER, FOR SOLUTION INTRAMUSCULAR; INTRAVENOUS at 14:40

## 2021-03-27 RX ADMIN — PHENYLEPHRINE HYDROCHLORIDE 100 MCG: 10 INJECTION INTRAVENOUS at 17:43

## 2021-03-27 RX ADMIN — PHENYLEPHRINE HYDROCHLORIDE 100 MCG: 10 INJECTION INTRAVENOUS at 17:25

## 2021-03-27 RX ADMIN — ROCURONIUM BROMIDE 20 MG: 10 INJECTION INTRAVENOUS at 17:53

## 2021-03-27 RX ADMIN — OXYCODONE HYDROCHLORIDE 10 MG: 5 TABLET ORAL at 04:19

## 2021-03-27 RX ADMIN — HEPARIN SODIUM 5000 UNITS: 5000 INJECTION, SOLUTION INTRAVENOUS; SUBCUTANEOUS at 05:15

## 2021-03-27 RX ADMIN — ACETAMINOPHEN 975 MG: 325 TABLET ORAL at 23:55

## 2021-03-27 RX ADMIN — GLUCAGON HYDROCHLORIDE 0.4 MG: KIT at 18:44

## 2021-03-27 RX ADMIN — HEPARIN SODIUM 5000 UNITS: 5000 INJECTION, SOLUTION INTRAVENOUS; SUBCUTANEOUS at 14:41

## 2021-03-27 RX ADMIN — LISINOPRIL 40 MG: 20 TABLET ORAL at 08:01

## 2021-03-27 RX ADMIN — ROCURONIUM BROMIDE 20 MG: 10 INJECTION INTRAVENOUS at 18:34

## 2021-03-27 RX ADMIN — OXYCODONE HYDROCHLORIDE 10 MG: 5 TABLET ORAL at 00:26

## 2021-03-27 RX ADMIN — ROCURONIUM BROMIDE 10 MG: 10 INJECTION INTRAVENOUS at 19:29

## 2021-03-27 RX ADMIN — SODIUM CHLORIDE, POTASSIUM CHLORIDE, SODIUM LACTATE AND CALCIUM CHLORIDE: 600; 310; 30; 20 INJECTION, SOLUTION INTRAVENOUS at 17:44

## 2021-03-27 RX ADMIN — SODIUM CHLORIDE, POTASSIUM CHLORIDE, SODIUM LACTATE AND CALCIUM CHLORIDE: 600; 310; 30; 20 INJECTION, SOLUTION INTRAVENOUS at 17:02

## 2021-03-27 RX ADMIN — PHENYLEPHRINE HYDROCHLORIDE 100 MCG: 10 INJECTION INTRAVENOUS at 17:19

## 2021-03-27 RX ADMIN — FENTANYL CITRATE 50 MCG: 50 INJECTION, SOLUTION INTRAMUSCULAR; INTRAVENOUS at 17:34

## 2021-03-27 RX ADMIN — ROCURONIUM BROMIDE 10 MG: 10 INJECTION INTRAVENOUS at 18:44

## 2021-03-27 RX ADMIN — AMLODIPINE BESYLATE 10 MG: 10 TABLET ORAL at 08:03

## 2021-03-27 RX ADMIN — OXYCODONE HYDROCHLORIDE 10 MG: 5 TABLET ORAL at 12:28

## 2021-03-27 RX ADMIN — ACETAMINOPHEN 975 MG: 325 TABLET ORAL at 08:03

## 2021-03-27 RX ADMIN — ROCURONIUM BROMIDE 10 MG: 10 INJECTION INTRAVENOUS at 18:54

## 2021-03-27 RX ADMIN — Medication 5 MG: at 17:25

## 2021-03-27 RX ADMIN — SUGAMMADEX 400 MG: 100 INJECTION, SOLUTION INTRAVENOUS at 20:29

## 2021-03-27 RX ADMIN — PROPOFOL 200 MG: 10 INJECTION, EMULSION INTRAVENOUS at 17:10

## 2021-03-27 RX ADMIN — ONDANSETRON 4 MG: 2 INJECTION INTRAMUSCULAR; INTRAVENOUS at 18:21

## 2021-03-27 RX ADMIN — Medication 5 MG: at 17:56

## 2021-03-27 RX ADMIN — DOCUSATE SODIUM 50 MG AND SENNOSIDES 8.6 MG 1 TABLET: 8.6; 5 TABLET, FILM COATED ORAL at 08:02

## 2021-03-27 RX ADMIN — ROCURONIUM BROMIDE 10 MG: 10 INJECTION INTRAVENOUS at 18:05

## 2021-03-27 RX ADMIN — METRONIDAZOLE 500 MG: 500 INJECTION, SOLUTION INTRAVENOUS at 23:55

## 2021-03-27 RX ADMIN — PHENYLEPHRINE HYDROCHLORIDE 100 MCG: 10 INJECTION INTRAVENOUS at 17:49

## 2021-03-27 RX ADMIN — POTASSIUM CHLORIDE, DEXTROSE MONOHYDRATE AND SODIUM CHLORIDE: 150; 5; 450 INJECTION, SOLUTION INTRAVENOUS at 14:39

## 2021-03-27 RX ADMIN — OXYCODONE HYDROCHLORIDE 10 MG: 5 TABLET ORAL at 08:03

## 2021-03-27 RX ADMIN — Medication 5 MG: at 19:04

## 2021-03-27 RX ADMIN — Medication 5 MG: at 19:45

## 2021-03-27 RX ADMIN — SODIUM CHLORIDE, POTASSIUM CHLORIDE, SODIUM LACTATE AND CALCIUM CHLORIDE: 600; 310; 30; 20 INJECTION, SOLUTION INTRAVENOUS at 20:40

## 2021-03-27 RX ADMIN — METRONIDAZOLE 500 MG: 500 INJECTION, SOLUTION INTRAVENOUS at 05:11

## 2021-03-27 RX ADMIN — ROCURONIUM BROMIDE 50 MG: 10 INJECTION INTRAVENOUS at 17:10

## 2021-03-27 RX ADMIN — ACETAMINOPHEN 975 MG: 325 TABLET ORAL at 00:26

## 2021-03-27 RX ADMIN — PHENYLEPHRINE HYDROCHLORIDE 100 MCG: 10 INJECTION INTRAVENOUS at 18:51

## 2021-03-27 ASSESSMENT — ACTIVITIES OF DAILY LIVING (ADL)
ADLS_ACUITY_SCORE: 17
ADLS_ACUITY_SCORE: 17
ADLS_ACUITY_SCORE: 16
ADLS_ACUITY_SCORE: 16

## 2021-03-27 NOTE — ANESTHESIA PREPROCEDURE EVALUATION
Anesthesia Pre-Procedure Evaluation    Patient: Nico Coello   MRN: [id number] : 1965        Preoperative Diagnosis: Bile duct leak [K83.8]   Procedure : Procedure(s):  ENDOSCOPIC RETROGRADE CHOLANGIOPANCREATOGRAPHY     History reviewed. No pertinent past medical history.   Past Surgical History:   Procedure Laterality Date     LAPAROSCOPIC CHOLECYSTECTOMY N/A 3/25/2021    Procedure: 1. Laparoscopic fenestrated subtotal cholecystectomy  2. Primary umbilical hernia repair   ;  Surgeon: Lauren William MD;  Location: [facility] OR      No Known Allergies   Social History     Tobacco Use     Smoking status: Never Smoker     Smokeless tobacco: Never Used   Substance Use Topics     Alcohol use: Never     Frequency: Never      Wt Readings from Last 1 Encounters:   21 103.8 kg (228 lb 13.4 oz)        Anesthesia Evaluation   Pt has had prior anesthetic.     History of anesthetic complications   Apneic in PACU s/p lap lila, requiring intubation. Patient noted to become apneic after admin of Zofran. AMS not resolved with Narcan.    ROS/MED HX  ENT/Pulmonary:  - neg pulmonary ROS     Neurologic:  - neg neurologic ROS     Cardiovascular:     (+) Dyslipidemia hypertension-----    METS/Exercise Tolerance:     Hematologic:  - neg hematologic  ROS     Musculoskeletal:  - neg musculoskeletal ROS     GI/Hepatic: Comment: s/p lap cholecystectomy on 3/25 for acute cholecystitis    bile leak post- cholecystectomy       Renal/Genitourinary:       Endo:     (+) type II DM, Obesity,     Psychiatric/Substance Use:  - neg psychiatric ROS     Infectious Disease:       Malignancy:  - neg malignancy ROS     Other:            Physical Exam    Airway  airway exam normal           Respiratory Devices and Support         Dental  no notable dental history         Cardiovascular   cardiovascular exam normal          Pulmonary   pulmonary exam normal                OUTSIDE LABS:  CBC:   Lab Results   Component Value Date    WBC  22.1 (H) 03/27/2021    WBC 17.5 (H) 03/26/2021    HGB 9.2 (L) 03/27/2021    HGB 8.4 (L) 03/26/2021    HCT 30.5 (L) 03/27/2021    HCT 24.9 (L) 03/26/2021     03/27/2021     (L) 03/26/2021     BMP:   Lab Results   Component Value Date     03/27/2021     03/26/2021    POTASSIUM 4.0 03/27/2021    POTASSIUM 4.2 03/26/2021    CHLORIDE 112 (H) 03/27/2021    CHLORIDE 111 (H) 03/26/2021    CO2 23 03/27/2021    CO2 21 03/26/2021    BUN 33 (H) 03/27/2021    BUN 34 (H) 03/26/2021    CR 1.34 (H) 03/27/2021    CR 1.76 (H) 03/26/2021     (H) 03/27/2021     (H) 03/26/2021     COAGS: No results found for: PTT, INR, FIBR  POC:   Lab Results   Component Value Date     (H) 03/27/2021     HEPATIC:   Lab Results   Component Value Date    ALBUMIN 2.6 (L) 03/27/2021    PROTTOTAL 7.8 03/27/2021    ALT 29 03/27/2021    AST 34 03/27/2021    ALKPHOS 106 03/27/2021    BILITOTAL 0.6 03/27/2021     OTHER:   Lab Results   Component Value Date    PH 7.36 03/26/2021    LACT 1.8 03/25/2021    A1C 5.9 (H) 03/25/2021    MICAELA 8.9 03/27/2021    PHOS 5.3 (H) 03/26/2021    MAG 2.1 03/26/2021    LIPASE 75 03/25/2021       Anesthesia Plan    ASA Status:  3   NPO Status:  NPO Appropriate    Anesthesia Type: General.     - Airway: ETT   Induction: Intravenous.   Maintenance: Inhalation.        Consents    Anesthesia Plan(s) and associated risks, benefits, and realistic alternatives discussed. Questions answered and patient/representative(s) expressed understanding.     - Discussed with:  Patient      - Extended Intubation/Ventilatory Support Discussed: No.      - Patient is DNR/DNI Status: No    Use of blood products discussed: No .     Postoperative Care    Pain management: Multi-modal analgesia.   PONV prophylaxis: Dexamethasone or Solumedrol, Ondansetron (or other 5HT-3)     Comments:                MD Deon West MD  Staff Anesthesiologist  [phone]

## 2021-03-27 NOTE — PROGRESS NOTES
Completed a respiratory assessment at 8:55 pm    Patient on room air, SpO2 97-99%  Lung sounds bilateral/equal/clear  Strong productive cough  Heart rate 70  Respiratory rate 18  No home respiratory treatment    Instructed patient on aerobika and stayed while he performed    Given above findings CPT QID will be discontinued . Patient will do aerobika several times per day    Rachele Suarez   Respiratory therapist

## 2021-03-27 NOTE — CONSULTS
GASTROENTEROLOGY CONSULTATION      Date of Admission:  3/24/2021           Reason for Consultation:   We were asked by Dr. William of surgery to evaluate this patient with c/f bile leak post- cholecystectomy            ASSESSMENT AND RECOMMENDATIONS:   Assessment:  55 year old male with a history of DMII (not currently on meds), HTN, and HLD who is s/p lap cholecystectomy on 3/25 for acute cholecystitis (perforated gallbladder found intraoperatively). GI is consulted for c/f biliary leak given that an intra-abdominal drain near the surgical site is draining bilious fluid.       Recommendations  - NPO   - ERCP today    Thank you for involving us in this patient's care. Please do not hesitate to contact the GI service with any questions or concerns.     Pt care plan discussed with Dr. Ruiz, GI staff physician.    Rachel Pagan MD  -------------------------------------------------------------------------------------------------------------------         History of Present Illness:   Nico Coello is a 55 year old male with a history of DMII (not currently on meds), HTN, and HLD who presented on 3/24 w/ acute Rt sided abdominal pain and chills, Tbili was 1.4, CT and US c/f acute cholecystitis. He underwent a lap cholecystectomy  w/o IOC and umbilical hernia repair on 3/25, at which time purulent fluid was found in the RUQ suggesting that the gallbladder had perforated. Post-op, he became apneic and required re-intubation (extubated on 3/26).     On 3/27, there was c/f biliary leak given that an intra-abdominal drain near the surgical site began to drain bilious fluid.          Data:   Key relevant imaging:  CT ABDOMEN PELVIS W CONTRAST, 3/25/2021    IMPRESSION:   1. Surgical changes recent laparoscopic cholecystectomy.   2. Right lateral approach abdominal drain with persistent gallbladder  fossa fluid which could represent postoperative fluid, hematoma,  and/or biloma. Correlate with drain fluid.  3.  "Inflammation of the hepatic flexure, increased from the  preoperative study. This is likely related to the infected gallbladder  and surgery. Keep in mind diverticulitis which seems unlikely in the  hepatic flexure.           Previous Endoscopy:   n/a         Medications:     Medications Prior to Admission   Medication Sig Dispense Refill Last Dose     amLODIPine-benazepril (LOTREL) 10-40 MG capsule Take 1 capsule by mouth daily        atorvastatin (LIPITOR) 40 MG tablet Take 40 mg by mouth daily                Physical Exam:   BP (!) 149/77 (BP Location: Left arm)   Pulse 64   Temp 97.9  F (36.6  C) (Oral)   Resp 18   Ht 1.651 m (5' 5\")   Wt 103.8 kg (228 lb 13.4 oz)   SpO2 95%   BMI 38.08 kg/m    Wt:   Wt Readings from Last 2 Encounters:   03/26/21 103.8 kg (228 lb 13.4 oz)   12/02/19 117.9 kg (260 lb)      Constitutional: NAD, on RA  Eyes: conjunctiva anicteric  CV: No edema  Respiratory: Unlabored breathing  Abd: nondistended, nontender, no peritoneal signs; abdominal drain with brown OP   Skin: warm, perfused, no jaundice  Neuro: AAO x 3, fluent speech   MSK: No gross deformities          Past Medical History:   Reviewed and edited as appropriate  History reviewed. No pertinent past medical history.         Past Surgical History:   Reviewed and edited as appropriate   Past Surgical History:   Procedure Laterality Date     LAPAROSCOPIC CHOLECYSTECTOMY N/A 3/25/2021    Procedure: 1. Laparoscopic fenestrated subtotal cholecystectomy  2. Primary umbilical hernia repair   ;  Surgeon: Lauren William MD;  Location:  OR            Social History:   Alcohol use: denied  Smoking history: denied         Family History:   Reviewed and edited as appropriate  History reviewed. No pertinent family history.  No known history of colorectal cancer, liver disease, or inflammatory bowel disease.         Allergies:   Reviewed and edited as appropriate   No Known Allergies           Review of Systems:     A " complete 10 point review of systems was performed and is negative except as noted in the HPI

## 2021-03-27 NOTE — PROGRESS NOTES
"General Surgery Progress Note    Subjective: Patient with multiple questions this morning about next steps in care. Says pain is well controlled although thirsty. Has been passing gas, but has not had a bowel movement last one was 3/24.    Objective:   BP (!) 149/80 (BP Location: Left arm)   Pulse 77   Temp 98.3  F (36.8  C) (Oral)   Resp 18   Ht 1.651 m (5' 5\")   Wt 103.8 kg (228 lb 13.4 oz)   SpO2 95%   BMI 38.08 kg/m      PE:  Gen: Alert, in no acute distress  CV: RRR  Resp: non-labored breathing on RA  Abd: Soft, non-distended, appropriately tender, no rigidity or guarding. GISELE drain with billios drainage. incisions c/d/i  Ext: warm and well perfused        Intake/Output Summary (Last 24 hours) at 3/27/2021 0954  Last data filed at 3/27/2021 0748  Gross per 24 hour   Intake 1910.47 ml   Output 1965 ml   Net -54.53 ml       Last Comprehensive Metabolic Panel:  Sodium   Date Value Ref Range Status   03/27/2021 140 133 - 144 mmol/L Final     Potassium   Date Value Ref Range Status   03/27/2021 4.0 3.4 - 5.3 mmol/L Final     Chloride   Date Value Ref Range Status   03/27/2021 112 (H) 94 - 109 mmol/L Final     Carbon Dioxide   Date Value Ref Range Status   03/27/2021 23 20 - 32 mmol/L Final     Anion Gap   Date Value Ref Range Status   03/27/2021 6 3 - 14 mmol/L Final     Glucose   Date Value Ref Range Status   03/27/2021 103 (H) 70 - 99 mg/dL Final     Urea Nitrogen   Date Value Ref Range Status   03/27/2021 33 (H) 7 - 30 mg/dL Final     Creatinine   Date Value Ref Range Status   03/27/2021 1.34 (H) 0.66 - 1.25 mg/dL Final     GFR Estimate   Date Value Ref Range Status   03/27/2021 59 (L) >60 mL/min/[1.73_m2] Final     Comment:     Non  GFR Calc  Starting 12/18/2018, serum creatinine based estimated GFR (eGFR) will be   calculated using the Chronic Kidney Disease Epidemiology Collaboration   (CKD-EPI) equation.       Calcium   Date Value Ref Range Status   03/27/2021 8.9 8.5 - 10.1 mg/dL Final "     Bilirubin Total   Date Value Ref Range Status   03/27/2021 0.6 0.2 - 1.3 mg/dL Final     Alkaline Phosphatase   Date Value Ref Range Status   03/27/2021 106 40 - 150 U/L Final     ALT   Date Value Ref Range Status   03/27/2021 29 0 - 70 U/L Final     AST   Date Value Ref Range Status   03/27/2021 34 0 - 45 U/L Final       CBC RESULTS:   Recent Labs   Lab Test 03/27/21  0757   WBC 22.1*   RBC 3.47*   HGB 9.2*   HCT 30.5*   MCV 88   MCH 26.5   MCHC 30.2*   RDW 18.5*            Fluid culture Abdominal Pus 3/24   Lactose fermenting Gram negative rods preliminary report    Blood culture 3/26  No growth after 22 hours      A/P: Nico Coello is a 55 year old male with history of metabolic syndrome c/b DM type II who is POD 1 from partial cholecystectomy for gangrenous cholecystitis. He was found to be apneic in PACU for which he was intubated and sent to the ICU. His leukocytosis has decreased and blood pressure is maintained without pressors. His hemoglobin has down trended to 7.6 although he has received large quantities of IV fluids since admission. CT has ruled out intracranial pathology and pulmonary embolus as cause for change in status. No on the floor.     Plan  - Continue abx for gangrenous cholecystitis. Switch from Zosyn to ceftriaxone/flagyl (pan sensitive E coli). Continue abx based on negative Cx   - Keep GISELE in x 1 week   - GI consult, possible ERCP   - restart home PTA BP meds  - Follow-up blood and surgical cultures    Patient seen and examined with chief resident, Dr. Hunt and staff Dr. Elisabet Valiente MD  General Surgery PGy1

## 2021-03-27 NOTE — PROGRESS NOTES
Pt very discouraged with PRN pain med process/inability to get any pain medicine early. RN explained in detail use of PRNs and asked pt if he would like the providers to be notified of inadequate pain control and he declined, as he only had 5/10 pain. IV dilaudid was offered and available but pt wanted oxy, which was not due yet. Oxy brought to pt when due and RN encouraged for pt to call when in pain and PRN pain meds can be further discussed.

## 2021-03-27 NOTE — PLAN OF CARE
"Shift: 1900 - 0700  VS: /82 (BP Location: Left arm)   Pulse 61   Temp 98  F (36.7  C) (Oral)   Resp 18   Ht 1.651 m (5' 5\")   Wt 103.8 kg (228 lb 13.4 oz)   SpO2 96%   BMI 38.08 kg/m      Neuro: A&Ox4, CMS intact ex tingling R hand; anxious, labile, and very particular about cares  Cardiac: denies cardiac chest pain, HTN with pain/anxiety  Resp: lung sounds clear, no SOB reported, sating well on RA, IS encouraged, Dalekauer at bedside  GI: passing gas, bowel sounds active, LBM 3/24  : voiding spontaneously, adequate amount of yellow urine   Skin: abdominal incisions CDI, mepilex on coccyx  Pain/Nausea: RUQ pain treated with IV dilaudid, oxy, and tylenol; denies nausea  LDA: R PIV SL, R internal jugular CVC, GISELE to bulb sx  Diet: clears  Mobility: SBA, ambulation encouraged  Labs: reviewed, , 109, & 83  Plan: continue plan of care     "

## 2021-03-28 LAB
ALBUMIN SERPL-MCNC: 2.3 G/DL (ref 3.4–5)
ALP SERPL-CCNC: 92 U/L (ref 40–150)
ALT SERPL W P-5'-P-CCNC: 20 U/L (ref 0–70)
AMYLASE SERPL-CCNC: 73 U/L (ref 30–110)
ANION GAP SERPL CALCULATED.3IONS-SCNC: 6 MMOL/L (ref 3–14)
AST SERPL W P-5'-P-CCNC: 21 U/L (ref 0–45)
BILIRUB SERPL-MCNC: 0.4 MG/DL (ref 0.2–1.3)
BUN SERPL-MCNC: 24 MG/DL (ref 7–30)
CALCIUM SERPL-MCNC: 8.6 MG/DL (ref 8.5–10.1)
CHLORIDE SERPL-SCNC: 110 MMOL/L (ref 94–109)
CO2 SERPL-SCNC: 24 MMOL/L (ref 20–32)
CREAT SERPL-MCNC: 1.1 MG/DL (ref 0.66–1.25)
ERCP: NORMAL
ERYTHROCYTE [DISTWIDTH] IN BLOOD BY AUTOMATED COUNT: 17.9 % (ref 10–15)
GFR SERPL CREATININE-BSD FRML MDRD: 75 ML/MIN/{1.73_M2}
GLUCOSE BLDC GLUCOMTR-MCNC: 102 MG/DL (ref 70–99)
GLUCOSE BLDC GLUCOMTR-MCNC: 107 MG/DL (ref 70–99)
GLUCOSE BLDC GLUCOMTR-MCNC: 154 MG/DL (ref 70–99)
GLUCOSE BLDC GLUCOMTR-MCNC: 90 MG/DL (ref 70–99)
GLUCOSE BLDC GLUCOMTR-MCNC: 93 MG/DL (ref 70–99)
GLUCOSE SERPL-MCNC: 107 MG/DL (ref 70–99)
HCT VFR BLD AUTO: 28.6 % (ref 40–53)
HGB BLD-MCNC: 8.8 G/DL (ref 13.3–17.7)
LIPASE SERPL-CCNC: 162 U/L (ref 73–393)
MCH RBC QN AUTO: 27.2 PG (ref 26.5–33)
MCHC RBC AUTO-ENTMCNC: 30.8 G/DL (ref 31.5–36.5)
MCV RBC AUTO: 88 FL (ref 78–100)
PLATELET # BLD AUTO: 176 10E9/L (ref 150–450)
POTASSIUM SERPL-SCNC: 3.4 MMOL/L (ref 3.4–5.3)
PROT SERPL-MCNC: 7.2 G/DL (ref 6.8–8.8)
RBC # BLD AUTO: 3.24 10E12/L (ref 4.4–5.9)
SODIUM SERPL-SCNC: 140 MMOL/L (ref 133–144)
WBC # BLD AUTO: 11.3 10E9/L (ref 4–11)

## 2021-03-28 PROCEDURE — 250N000013 HC RX MED GY IP 250 OP 250 PS 637: Performed by: SURGERY

## 2021-03-28 PROCEDURE — 250N000013 HC RX MED GY IP 250 OP 250 PS 637

## 2021-03-28 PROCEDURE — 36415 COLL VENOUS BLD VENIPUNCTURE: CPT

## 2021-03-28 PROCEDURE — 258N000003 HC RX IP 258 OP 636: Performed by: SURGERY

## 2021-03-28 PROCEDURE — 120N000002 HC R&B MED SURG/OB UMMC

## 2021-03-28 PROCEDURE — 80053 COMPREHEN METABOLIC PANEL: CPT

## 2021-03-28 PROCEDURE — 85027 COMPLETE CBC AUTOMATED: CPT | Performed by: SURGERY

## 2021-03-28 PROCEDURE — 999N001017 HC STATISTIC GLUCOSE BY METER IP

## 2021-03-28 PROCEDURE — 83690 ASSAY OF LIPASE: CPT

## 2021-03-28 PROCEDURE — 36415 COLL VENOUS BLD VENIPUNCTURE: CPT | Performed by: SURGERY

## 2021-03-28 PROCEDURE — 87040 BLOOD CULTURE FOR BACTERIA: CPT | Performed by: SURGERY

## 2021-03-28 PROCEDURE — 82150 ASSAY OF AMYLASE: CPT

## 2021-03-28 PROCEDURE — 250N000011 HC RX IP 250 OP 636: Performed by: STUDENT IN AN ORGANIZED HEALTH CARE EDUCATION/TRAINING PROGRAM

## 2021-03-28 PROCEDURE — 99232 SBSQ HOSP IP/OBS MODERATE 35: CPT | Mod: GC | Performed by: INTERNAL MEDICINE

## 2021-03-28 RX ADMIN — OXYCODONE HYDROCHLORIDE 10 MG: 5 TABLET ORAL at 06:26

## 2021-03-28 RX ADMIN — Medication 1 MG: at 23:04

## 2021-03-28 RX ADMIN — DOCUSATE SODIUM 50 MG AND SENNOSIDES 8.6 MG 1 TABLET: 8.6; 5 TABLET, FILM COATED ORAL at 19:31

## 2021-03-28 RX ADMIN — AMLODIPINE BESYLATE 10 MG: 10 TABLET ORAL at 08:06

## 2021-03-28 RX ADMIN — HEPARIN SODIUM 5000 UNITS: 5000 INJECTION, SOLUTION INTRAVENOUS; SUBCUTANEOUS at 15:06

## 2021-03-28 RX ADMIN — METRONIDAZOLE 500 MG: 500 INJECTION, SOLUTION INTRAVENOUS at 08:06

## 2021-03-28 RX ADMIN — OXYCODONE HYDROCHLORIDE 10 MG: 5 TABLET ORAL at 23:04

## 2021-03-28 RX ADMIN — ACETAMINOPHEN 650 MG: 325 TABLET ORAL at 20:51

## 2021-03-28 RX ADMIN — DOCUSATE SODIUM 50 MG AND SENNOSIDES 8.6 MG 2 TABLET: 8.6; 5 TABLET, FILM COATED ORAL at 08:00

## 2021-03-28 RX ADMIN — METRONIDAZOLE 500 MG: 500 INJECTION, SOLUTION INTRAVENOUS at 15:07

## 2021-03-28 RX ADMIN — POLYETHYLENE GLYCOL 3350 17 G: 17 POWDER, FOR SOLUTION ORAL at 08:07

## 2021-03-28 RX ADMIN — ACETAMINOPHEN 975 MG: 325 TABLET ORAL at 08:06

## 2021-03-28 RX ADMIN — HEPARIN SODIUM 5000 UNITS: 5000 INJECTION, SOLUTION INTRAVENOUS; SUBCUTANEOUS at 23:07

## 2021-03-28 RX ADMIN — POTASSIUM CHLORIDE, DEXTROSE MONOHYDRATE AND SODIUM CHLORIDE: 150; 5; 450 INJECTION, SOLUTION INTRAVENOUS at 08:27

## 2021-03-28 RX ADMIN — LISINOPRIL 40 MG: 20 TABLET ORAL at 08:06

## 2021-03-28 RX ADMIN — OXYCODONE HYDROCHLORIDE 10 MG: 5 TABLET ORAL at 10:59

## 2021-03-28 RX ADMIN — OXYCODONE HYDROCHLORIDE 10 MG: 5 TABLET ORAL at 18:52

## 2021-03-28 RX ADMIN — HEPARIN SODIUM 5000 UNITS: 5000 INJECTION, SOLUTION INTRAVENOUS; SUBCUTANEOUS at 08:07

## 2021-03-28 RX ADMIN — CEFTRIAXONE 1 G: 1 INJECTION, POWDER, FOR SOLUTION INTRAMUSCULAR; INTRAVENOUS at 15:06

## 2021-03-28 RX ADMIN — METRONIDAZOLE 500 MG: 500 INJECTION, SOLUTION INTRAVENOUS at 23:07

## 2021-03-28 ASSESSMENT — ACTIVITIES OF DAILY LIVING (ADL)
ADLS_ACUITY_SCORE: 16

## 2021-03-28 NOTE — ANESTHESIA CARE TRANSFER NOTE
Patient: Nico Coello    Procedure(s):  ENDOSCOPIC RETROGRADE CHOLANGIOPANCREATOGRAPHY, WITH BILIARY SPGINCTEROTOMY, BALLOON DILATION,  AND STENT INSERTION    Diagnosis: Bile duct leak [K83.8]  Diagnosis Additional Information: No value filed.    Anesthesia Type:   No value filed.     Note:    Oropharynx: oropharynx clear of all foreign objects and spontaneously breathing  Level of Consciousness: awake and drowsy  Oxygen Supplementation: face mask  Level of Supplemental Oxygen (L/min / FiO2): 5  Independent Airway: airway patency satisfactory and stable  Dentition: dentition unchanged  Vital Signs Stable: post-procedure vital signs reviewed and stable  Report to RN Given: handoff report given  Patient transferred to: PACU    Handoff Report: Identifed the Patient, Identified the Reponsible Provider, Reviewed the pertinent medical history, Discussed the surgical course, Reviewed Intra-OP anesthesia mangement and issues during anesthesia, Set expectations for post-procedure period and Allowed opportunity for questions and acknowledgement of understanding      Vitals: (Last set prior to Anesthesia Care Transfer)  CRNA VITALS  3/27/2021 2002 - 3/27/2021 2040      3/27/2021             Pulse:  74    SpO2:  100 %        Electronically Signed By: LINDA Marquez CRNA  March 27, 2021  8:40 PM

## 2021-03-28 NOTE — BRIEF OP NOTE
[facility] Brief Operative Note    Pre-operative diagnosis: Bile duct leak [K83.8]   Post-operative diagnosis * No post-op diagnosis entered *   Procedure: Procedure(s):  ENDOSCOPIC RETROGRADE CHOLANGIOPANCREATOGRAPHY, WITH BILIARY SPGINCTEROTOMY, BALLOON DILATION,  AND STENT INSERTION   Surgeon: Guru Daya MD       Estimated blood loss: None    Specimens: None   Findings:      Extremely stenotic papilla and very difficult cannulation    Double wire cannulation    9 mm biliary sphinctertomy and a 4 Fr by 11 cm prophylactic Mckee stent in PD    A 10 Fr by 5 cm Sofflex in CBD    Recommendations    Monitor GISELE drain output    Monitor LFTs, amylase and lipase in am

## 2021-03-28 NOTE — PLAN OF CARE
Vital signs:  Temp: 98.3  F (36.8  C) Temp src: Oral BP: 136/68 Pulse: 64   Resp: 17 SpO2: 99 % O2 Device: Nasal cannula Oxygen Delivery: 2 LPM     Time: 8502-0505  Neuros: A&Ox4, able to verbalize needs.   Cardiac: HTN, denies chest pain.   Respiratory: Sats % on 2L NC. LS clear, denies SOB, good productive cough -using yankauer suction.   Pain: pt reports abdomen is sore pain managed with prn oxycodone x1 and scheduled tylenol with relief.    Nausea: Denies nausea.   Diet: CLD.   GI/: Voiding adequate amount. +BS, +flatus, +BM this am.   Skin/incisions: 4 lap sites covered with primapore.   Drains: R GISELE with minimal output, dressing c/d/i.   LDA: (R) internal jugular CVC triple lumen. MIVF infusing at 75 ml/hr. TKO with IV Abx.   Labs:  and 154.   Activity: Up ambulated to BR with walker, SBA.   New Changes this Shift: None   Plan: Continue POC.

## 2021-03-28 NOTE — PROGRESS NOTES
"General Surgery Progress Note    Subjective: No acute issues. Feels better. ERCP yesterday with stenotic papilla. Underwent a sphincterectomy with CBD and PD stents.     Objective:   BP (!) 168/94 (BP Location: Left arm)   Pulse 72   Temp 98  F (36.7  C) (Oral)   Resp 16   Ht 1.651 m (5' 5\")   Wt 103.8 kg (228 lb 13.4 oz)   SpO2 94%   BMI 38.08 kg/m      PE:  Gen: Alert, in no acute distress  CV: RRR  Resp: non-labored breathing on RA  Abd: Soft, non-distended, appropriately tender. GISELE drain with serous drainage. incisions c/d/i  Ext: warm and well perfused        Intake/Output Summary (Last 24 hours) at 3/27/2021 0954  Last data filed at 3/27/2021 0748  Gross per 24 hour   Intake 1910.47 ml   Output 1965 ml   Net -54.53 ml     Am labs are pending     Fluid culture Abdominal Pus 3/24   Lactose fermenting Gram negative rods preliminary report    Blood culture 3/26  No growth to date       A/P: Nico Coello is a 55 year old male with history of metabolic syndrome c/b DM type II who is POD 1 from partial cholecystectomy for gangrenous cholecystitis. He was found to be apneic in PACU for which he was intubated and sent to the ICU. His leukocytosis has decreased and blood pressure is maintained without pressors. His hemoglobin has down trended to 7.6 although he has received large quantities of IV fluids since admission. CT has ruled out intracranial pathology and pulmonary embolus as cause for change in status. No on the floor.     Plan  - Continue abx for gangrenous cholecystitis. Switch from Zosyn to ceftriaxone/flagyl (pan sensitive E coli). Will switch to PO abx upon discharge for a total of 7-10 days   - Keep GISELE in x 1 week   - Appreciate GI assistance   - home PTA BP meds  - Diet as tolerated pending AM labs  - Home likely tomorrow.     Db Hunt MD  General Surgery, PGY-5    "

## 2021-03-28 NOTE — PROGRESS NOTES
"Patient arrived form PACU @2200. Patient declining to move or have nurse assess him. Patient states\"i want to go to my room now\". Patient informed he is in his room and stated that he wanted to be left alone. Patient lethargic but arousal to voice. Patient re approached and allowed nurse to observed 4 lap sites covered with prima pore tape and right side drain. Dressing around drain clean dry and intact. Cont with POC.  "

## 2021-03-28 NOTE — ANESTHESIA POSTPROCEDURE EVALUATION
Patient: Nico Coello    Procedure(s):  ENDOSCOPIC RETROGRADE CHOLANGIOPANCREATOGRAPHY, WITH BILIARY SPGINCTEROTOMY, BALLOON DILATION,  AND STENT INSERTION    Diagnosis:Bile duct leak [K83.8]  Diagnosis Additional Information: No value filed.    Anesthesia Type:  No value filed.    Note:  Disposition: Admission   Postop Pain Control: Uneventful            Sign Out: Well controlled pain   PONV: No   Neuro/Psych: Uneventful            Sign Out: Acceptable/Baseline neuro status   Airway/Respiratory: Uneventful            Sign Out: Acceptable/Baseline resp. status   CV/Hemodynamics: Uneventful            Sign Out: Acceptable CV status   Other NRE:    DID A NON-ROUTINE EVENT OCCUR?          Last vitals:  Vitals:    03/27/21 2100 03/27/21 2115 03/27/21 2130   BP: (!) 173/90 (!) 165/87 (!) 153/74   Pulse: 63 58 62   Resp: 15 16 16   Temp: 36.1  C (97  F) 35.8  C (96.4  F) 36.1  C (97  F)   SpO2: 100% 99% 99%       Last vitals prior to Anesthesia Care Transfer:  CRNA VITALS  3/27/2021 2002 - 3/27/2021 2102      3/27/2021             SpO2:  100 %          Electronically Signed By: Delmi Orlando MD  March 27, 2021  9:48 PM

## 2021-03-28 NOTE — PLAN OF CARE
S/p lap lila and ERCP. Alert and oriented x 4. Vital signs stable. On room air. Abdominal pain fully managed with oral pain medication. Fair oral intake.voids without difficulty. Had Bm. PCD on when resting in bed. Plan: Continue care.

## 2021-03-29 ENCOUNTER — PATIENT OUTREACH (OUTPATIENT)
Dept: CARE COORDINATION | Facility: CLINIC | Age: 56
End: 2021-03-29

## 2021-03-29 VITALS
TEMPERATURE: 98.5 F | SYSTOLIC BLOOD PRESSURE: 160 MMHG | OXYGEN SATURATION: 100 % | WEIGHT: 228.84 LBS | HEIGHT: 65 IN | BODY MASS INDEX: 38.13 KG/M2 | DIASTOLIC BLOOD PRESSURE: 79 MMHG | HEART RATE: 75 BPM | RESPIRATION RATE: 16 BRPM

## 2021-03-29 LAB
ERYTHROCYTE [DISTWIDTH] IN BLOOD BY AUTOMATED COUNT: 17.8 % (ref 10–15)
GLUCOSE BLDC GLUCOMTR-MCNC: 109 MG/DL (ref 70–99)
GLUCOSE BLDC GLUCOMTR-MCNC: 110 MG/DL (ref 70–99)
GLUCOSE BLDC GLUCOMTR-MCNC: 76 MG/DL (ref 70–99)
GLUCOSE BLDC GLUCOMTR-MCNC: 79 MG/DL (ref 70–99)
HCT VFR BLD AUTO: 29.9 % (ref 40–53)
HGB BLD-MCNC: 9.2 G/DL (ref 13.3–17.7)
MCH RBC QN AUTO: 27.2 PG (ref 26.5–33)
MCHC RBC AUTO-ENTMCNC: 30.8 G/DL (ref 31.5–36.5)
MCV RBC AUTO: 89 FL (ref 78–100)
PLATELET # BLD AUTO: 202 10E9/L (ref 150–450)
RBC # BLD AUTO: 3.38 10E12/L (ref 4.4–5.9)
WBC # BLD AUTO: 9 10E9/L (ref 4–11)

## 2021-03-29 PROCEDURE — 250N000013 HC RX MED GY IP 250 OP 250 PS 637: Performed by: SURGERY

## 2021-03-29 PROCEDURE — 87040 BLOOD CULTURE FOR BACTERIA: CPT | Performed by: SURGERY

## 2021-03-29 PROCEDURE — 250N000011 HC RX IP 250 OP 636: Performed by: STUDENT IN AN ORGANIZED HEALTH CARE EDUCATION/TRAINING PROGRAM

## 2021-03-29 PROCEDURE — 999N001017 HC STATISTIC GLUCOSE BY METER IP

## 2021-03-29 PROCEDURE — 250N000013 HC RX MED GY IP 250 OP 250 PS 637

## 2021-03-29 PROCEDURE — 85027 COMPLETE CBC AUTOMATED: CPT | Performed by: SURGERY

## 2021-03-29 PROCEDURE — 36592 COLLECT BLOOD FROM PICC: CPT | Performed by: SURGERY

## 2021-03-29 RX ORDER — ACETAMINOPHEN 325 MG/1
650 TABLET ORAL EVERY 4 HOURS PRN
Qty: 60 TABLET | Refills: 0 | Status: SHIPPED | OUTPATIENT
Start: 2021-03-29

## 2021-03-29 RX ORDER — AMOXICILLIN 250 MG
1-2 CAPSULE ORAL 2 TIMES DAILY
Qty: 20 TABLET | Refills: 0 | Status: SHIPPED | OUTPATIENT
Start: 2021-03-29

## 2021-03-29 RX ORDER — OXYCODONE HYDROCHLORIDE 5 MG/1
5 TABLET ORAL EVERY 4 HOURS PRN
Qty: 12 TABLET | Refills: 0 | Status: SHIPPED | OUTPATIENT
Start: 2021-03-29

## 2021-03-29 RX ORDER — POLYETHYLENE GLYCOL 3350 17 G/17G
17 POWDER, FOR SOLUTION ORAL DAILY
Qty: 510 G | Refills: 0 | Status: SHIPPED | OUTPATIENT
Start: 2021-03-29

## 2021-03-29 RX ORDER — OXYCODONE HYDROCHLORIDE 5 MG/1
5 TABLET ORAL EVERY 4 HOURS PRN
Qty: 12 TABLET | Refills: 0 | Status: SHIPPED | OUTPATIENT
Start: 2021-03-29 | End: 2021-03-29

## 2021-03-29 RX ADMIN — ACETAMINOPHEN 650 MG: 325 TABLET ORAL at 03:10

## 2021-03-29 RX ADMIN — LISINOPRIL 40 MG: 20 TABLET ORAL at 08:35

## 2021-03-29 RX ADMIN — AMLODIPINE BESYLATE 10 MG: 10 TABLET ORAL at 08:35

## 2021-03-29 RX ADMIN — OXYCODONE HYDROCHLORIDE 10 MG: 5 TABLET ORAL at 03:11

## 2021-03-29 RX ADMIN — HEPARIN SODIUM 5000 UNITS: 5000 INJECTION, SOLUTION INTRAVENOUS; SUBCUTANEOUS at 08:35

## 2021-03-29 ASSESSMENT — ACTIVITIES OF DAILY LIVING (ADL)
ADLS_ACUITY_SCORE: 16

## 2021-03-29 NOTE — PROGRESS NOTES
"    GASTROENTEROLOGY PROGRESS NOTE    Date: 03/28/2021     ASSESSMENT:  55 year old male with a history of DMII (not currently on meds), HTN, and HLD who is s/p lap cholecystectomy on 3/25 for acute cholecystitis (perforated gallbladder found intraoperatively). GI was consulted for c/f biliary leak given that an intra-abdominal drain near the surgical site was draining bilious fluid.        ERCP was performed on 3/27 w/ biliary sphinctertomy w/ stents placed in the CBD as well as prophylactically in the PD. Patient doing well post-op.     RECOMMENDATIONS:  - ADAT, would keep patient on IV fluids until he is tolerating a regular diet       Thank you for involving us in this patient's care. Please do not hesitate to contact the GI service with any questions or concerns.      Pt care plan discussed with Dr. Stapleton, GI staff physician.    Rachel Pagan   Gastroenterology Fellow  _______________________________________________________________  Subjective:  Patient denied pain, has been having bowel movements whereas before he was constipation; ambulating well; tolerating a CLD w/o N/V     Objective:  Blood pressure (!) 158/76, pulse 76, temperature 98.7  F (37.1  C), temperature source Oral, resp. rate 14, height 1.651 m (5' 5\"), weight 103.8 kg (228 lb 13.4 oz), SpO2 96 %.    Constitutional: NAD, on RA  Eyes: conjunctiva anicteric  CV: No edema  Respiratory: Unlabored breathing  Abd: nondistended, +bs, nontender, no peritoneal signs  Skin: warm, perfused, no jaundice  Neuro: AAO x 3, fluent speech   Psych: Normal affect  MSK: No gross deformities    LABS:  BMP  Recent Labs   Lab 03/28/21  0737 03/27/21  0757 03/26/21  0335 03/25/21  1200    140 139 139   POTASSIUM 3.4 4.0 4.2 4.1   CHLORIDE 110* 112* 111* 109   MICAELA 8.6 8.9 8.0* 8.3*   CO2 24 23 21 23   BUN 24 33* 34* 35*   CR 1.10 1.34* 1.76* 1.88*   * 103* 124* 122*     CBC  Recent Labs   Lab 03/28/21  0737 03/27/21  0757 03/26/21  0335 " 03/26/21  0335 03/25/21  1200   WBC 11.3* 22.1*  --  17.5* 24.2*   RBC 3.24* 3.47*  --  2.87* 3.66*   HGB 8.8* 9.2*   < > 7.6* 9.8*   HCT 28.6* 30.5*  --  24.9* 32.8*   MCV 88 88  --  87 90   MCH 27.2 26.5  --  26.5 26.8   MCHC 30.8* 30.2*  --  30.5* 29.9*   RDW 17.9* 18.5*  --  18.4* 18.2*    192  --  131* 160    < > = values in this interval not displayed.     INRNo lab results found in last 7 days.  LFTs  Recent Labs   Lab 03/28/21  0737 03/27/21  0757 03/26/21  0335 03/25/21  0700   ALKPHOS 92 106 87 125   AST 21 34 38 37   ALT 20 29 33 24   BILITOTAL 0.4 0.6 0.8 1.4*   PROTTOTAL 7.2 7.8 6.6* 7.7   ALBUMIN 2.3* 2.6* 2.1* 2.9*      PANC  Recent Labs   Lab 03/28/21  0737 03/25/21  0700   LIPASE 162 75   AMYLASE 73  --        IMAGING:  CT ABDOMEN PELVIS W CONTRAST, 3/25/2021    IMPRESSION:   1. Surgical changes recent laparoscopic cholecystectomy.   2. Right lateral approach abdominal drain with persistent gallbladder  fossa fluid which could represent postoperative fluid, hematoma,  and/or biloma. Correlate with drain fluid.  3. Inflammation of the hepatic flexure, increased from the  preoperative study. This is likely related to the infected gallbladder  and surgery. Keep in mind diverticulitis which seems unlikely in the  hepatic flexure.

## 2021-03-29 NOTE — PLAN OF CARE
"BP (!) 140/76 (BP Location: Left arm)   Pulse 68   Temp 98.8  F (37.1  C) (Oral)   Resp 16   Ht 1.651 m (5' 5\")   Wt 103.8 kg (228 lb 13.4 oz)   SpO2 97%   BMI 38.08 kg/m      [id number]  Neuro: A&Ox4, calls appropriately, verbalize needs.  Activity: up with SBA  Cardiac: WNL, Denies chest pain  Respiratory: WDL, RA, Denies SOB  GI/: voiding adequately and no BM  Pain: denies pain  Lines:  New L PIV-tko after abx  Diet: Good appetite, Tolerating diet, denies nausea  Labs: reviewed, ,79  Changes/Plan: Patient appears to rest between cares. Cont. with POC  "

## 2021-03-29 NOTE — PLAN OF CARE
Alert and oriented, able to make needs know. Incisions CDI. internal jugular CVC removed. Voiding, +BS. Up ad enoch. Discharge instructions given, no further questions. Pt discharged home with friend Héctor byrnes.

## 2021-03-29 NOTE — DISCHARGE SUMMARY
"[facility]   General Surgery Discharge Summary    Date of Admission: 3/24/2021  Date of Discharge: 3/29/2021    Admission Diagnosis:  Acute cholecystitis [K81.0]    Discharge Diagnosis:  1. Same as above    Consultations:  GI PANCREATICOBILIARY ADULT IP CONSULT    Procedures:  Laparoscopic partial cholecystectomy   ERCP sphincterotomy with biliary stent     Brief HPI:  Nico Coello is a 55 year old male with history of metabolic syndrome c/b DM type II who presented to the ED with 1 day of chills, fever, pre-syncope, rigors and RUQ pain radiating to the back.     Hospital Course:  The patient was admitted and underwent partial cholecystectomy for gangrenous cholecystitis. He was found to be apneic in PACU for which he was intubated and sent to the ICU. CT ruled out intracranial pathology and pulmonary embolus as cause for change in status. On 3/27 he underwent ERCP with sphincterotomy for biliary blockage with improvement in GISELE drainage. Patient was feeling well and stated he was ready for discharge as of 3/29. The patient's diet was slowly advanced as bowel function returned. Pain was controlled with oral pain medication and the patient was able to ambulate and void without difficulty. The patient received appropriate education post operatively. On March 29, 2021 the patient was discharged to home with oral antibiotics.     Discharge Physical Exam:  BP (!) 140/76 (BP Location: Left arm)   Pulse 68   Temp 98.8  F (37.1  C) (Oral)   Resp 16   Ht 1.651 m (5' 5\")   Wt 103.8 kg (228 lb 13.4 oz)   SpO2 97%   BMI 38.08 kg/m      Gen: Alert, resting comfortably  Lungs: non-labored breathing on room air  CV: regular rhythm, normal rate   Abd: soft, non-distended, appropriately tender, incisions are c/d/i  Ext: no peripheral edema    Meds:       Review of your medicines      START taking      Dose / Directions   acetaminophen 325 MG tablet  Commonly known as: TYLENOL      Dose: " 650 mg  Take 2 tablets (650 mg) by mouth every 4 hours as needed for other (For optimal non-opioid multimodal pain management to improve pain control.)  Quantity: 60 tablet  Refills: 0     amoxicillin-clavulanate 875-125 MG tablet  Commonly known as: AUGMENTIN      Dose: 1 tablet  Take 1 tablet by mouth 2 times daily for 3 days  Quantity: 6 tablet  Refills: 0     oxyCODONE 5 MG tablet  Commonly known as: ROXICODONE      Dose: 5 mg  Take 1 tablet (5 mg) by mouth every 4 hours as needed for moderate to severe pain  Quantity: 12 tablet  Refills: 0     polyethylene glycol 17 GM/Dose powder  Commonly known as: MIRALAX      Dose: 17 g  Take 17 g by mouth daily  Quantity: 510 g  Refills: 0     senna-docusate 8.6-50 MG tablet  Commonly known as: SENOKOT-S/PERICOLACE      Dose: 1-2 tablet  Take 1-2 tablets by mouth 2 times daily  Quantity: 20 tablet  Refills: 0        CONTINUE these medicines which have NOT CHANGED      Dose / Directions   amLODIPine-benazepril 10-40 MG capsule  Commonly known as: LOTREL      Dose: 1 capsule  Take 1 capsule by mouth daily  Refills: 0     atorvastatin 40 MG tablet  Commonly known as: LIPITOR      Dose: 40 mg  Take 40 mg by mouth daily  Refills: 0           Where to get your medicines      These medications were sent to [id number] - [place], [place] - [address] [place] [address]  [address], [place] [address]    Phone: [phone]     polyethylene glycol 17 GM/Dose powder    senna-docusate 8.6-50 MG tablet     Some of these will need a paper prescription and others can be bought over the counter. Ask your nurse if you have questions.    Bring a paper prescription for each of these medications    acetaminophen 325 MG tablet    amoxicillin-clavulanate 875-125 MG tablet    oxyCODONE 5 MG tablet         Additional instructions:  After Care     Future Labs/Procedures    Activity     Comments:    Your activity upon discharge: activity as tolerated    Diet     Comments:    Follow this  diet upon discharge: Orders Placed This Encounter      Regular Diet Adult    Discharge Instructions     Comments:    Discharge Instructions    Incisions  - You have several 4 incisions covered with sterri strips. These will fall off on their own in about a week, if they have not, it is ok to gently remove.  - You may resume showering today, but do not take baths for at least 4 weeks.   - Incisions can get wet and be gently washed with running soapy water, do not scrubs incisions.      Medications  - Do not take any additional Tylenol (acetaminophen) while using a narcotic pain medication which includes acetaminophen  - Do not take more than 4,000mg of acetaminophen in any 24 hour period, as this can cause liver damage  - Take stool softeners such as Senna or Miralax while you are using narcotics, but stop if you develop diarrhea  - Wean yourself off of narcotic pain medications    Follow-Up:  - Call your primary care provider to touch base regarding your recent admission.     - Call or return sooner than your regularly scheduled visit if you develop any of the following: fever >101.5, uncontrolled pain, uncontrolled nausea or vomiting, as well as increased redness, swelling, or drainage from your wound.   -  A nurse from the General Surgery Clinic will contact you 24 hours, or next business day, after your discharge from the hospital.  If you have questions or to schedule a follow up appointment please call the General Surgery Clinic at [phone].  Call [phone] and ask to speak with the Surgery resident on call if you are having troubles in the evenings, at night, or on the weekend.  -  If you are receiving home care please inform your home care nurse of our contact number.            Follow Up:    -Follow up with general surgery 2 week(s) after discharge.     GENERAL SURGERY PATIENTS: Call [phone] for scheduling needs or to reach your care team    The patient was discussed with the chief resident and  staff on the day of discharge     Cristian Lozada, MS3    Addendum     Rola Rankin MD  PGY 5

## 2021-03-29 NOTE — PROGRESS NOTES
"General Surgery Progress Note    Subjective: No acute events overnight. Pain is well controlled, tolerating liquid diet and passed 3 BMs yesterday. Would like to try solid foods. Ambulating and urinating.     Objective:   BP (!) 140/76 (BP Location: Left arm)   Pulse 68   Temp 98.8  F (37.1  C) (Oral)   Resp 16   Ht 1.651 m (5' 5\")   Wt 103.8 kg (228 lb 13.4 oz)   SpO2 97%   BMI 38.08 kg/m      PE:  Gen: Alert, resting comfortably  CV: Regular rate and non-cyanotic appearing   Resp: non-labored breathing on room air  Abd: Soft, non-distended, appropriately tender, no rigidity or guarding. GISELE with serosanguinous drainage  Ext: warm and well perfused      Intake/Output Summary (Last 24 hours) at 3/29/2021 0549  Last data filed at 3/29/2021 0204  Gross per 24 hour   Intake 860 ml   Output 490 ml   Net 370 ml     Fluid culture Abdominal Pus 3/24   Lactose fermenting Gram negative rods preliminary report    Blood cultures 3/28  No growth to date  Blood cultures 3/29 Pending  CBC 3/29 Pending  BMP 3/29 Pending         A/P: A/P: Nico Coello is a 55 year old male with history of metabolic syndrome c/b DM type II who is POD 1 from partial cholecystectomy for gangrenous cholecystitis. He was found to be apneic in PACU for which he was intubated and sent to the ICU. His leukocytosis has decreased and blood pressure is maintained without pressors. His hemoglobin has down trended to 7.6 although he has received large quantities of IV fluids since admission. CT has ruled out intracranial pathology and pulmonary embolus as cause for change in status. On 3/27 he underwent ERCP with sphincterotomy for biliary blockage with improvement in GISELE drainage. Patient is feeling well and states he is ready for discharge as of 3/29.     - PO abx for discharge   - GISELE and staples out today, replaced staples w/ steri strips  - Regular diet  -discontinue central line  - Discharge today      Cristian Lozada, MS3    I have seen and " examined the patient with the medical student and have edited the note where appropriate. I agree with the assessment and plan as written above.     Jeffry Romo MD  Surgery PGY1

## 2021-03-30 ENCOUNTER — PATIENT OUTREACH (OUTPATIENT)
Dept: SURGERY | Facility: CLINIC | Age: 56
End: 2021-03-30

## 2021-03-30 ENCOUNTER — PATIENT OUTREACH (OUTPATIENT)
Dept: GASTROENTEROLOGY | Facility: CLINIC | Age: 56
End: 2021-03-30

## 2021-03-30 LAB
BACTERIA SPEC CULT: ABNORMAL
BACTERIA SPEC CULT: ABNORMAL
BACTERIA SPEC CULT: NO GROWTH
COPATH REPORT: NORMAL
SPECIMEN SOURCE: ABNORMAL
SPECIMEN SOURCE: NORMAL

## 2021-03-30 NOTE — TELEPHONE ENCOUNTER
Per inpatient team  Procedure: ERCP   Physician: Dr. Stapleton   Timin weeks   Dx: Bile duct leak     Last ERCP on 3/27. Called to discuss with patient. Will hold  for procedure date. Left message.      ML

## 2021-03-30 NOTE — PROGRESS NOTES
Debbi Mariano is a patient of Dr. Lauren Hernández that underwent laparoscopic cholecystectomy and open umbilical hernia repair approximately 5 days ago (3/25). He was recently discharged from the hospital. Attempted to contact patient via telephone for a status update and review post op teaching.  LM on  to call office.  Await return call.      Of note:  Pathology:  Pending  Wound:  Steri-strips. Staples and GISELE drain removed prior to discharge.  Follow-up:  PO with Dr. Colón 4/8 at 1:15 PM.  Can be virtual if patient desires.  Restrictions:  - No strenuous exercise for 3-4 weeks  - No lifting, pushing, pulling more than 15-20 pounds for 3-4 weeks  New medications:  Tylenol, Augmentin, Oxycodone, Miralax  Equipment/Supplies:  None    ADDENDUM  03/31/21  12:53 PM  Attempted to contact patient x 2 for post hospital/procedure telephone call/status update.  LM on  to call office-contact information provided.  Anne-Marie Torres RN, BSN, CNOR, RNFA, CBCN  RN General Surgery    ADDENDUM  04/01/21  8:51 AM  Patient has not returned call.Pathology reviewed.  Encounter closed.    Pathology:  Copath Report Patient Name: DEBBI MARIANO   MR#: [id number]   Specimen #: [id number]   Collected: 3/25/2021   Received: 3/25/2021   Reported: 3/30/2021 12:45   Ordering Phy(s): LAUREN HERNÁNDEZ     For improved result formatting, select 'View Enhanced Report Format' under    Linked Documents section.     SPECIMEN(S):   Gallbladder     FINAL DIAGNOSIS:   Gallbladder, Cholecystectomy:   Acute gangrenous cholecystitis with fat necrosis and serositis

## 2021-03-30 NOTE — LETTER
April 19, 2021    Nico Coello                              [address]  [place] [address]    Dear Nico,     This letter is related to follow up from your recent hospital stay.     Dr. Stapleton performed an ERCP on March 24, 2021. As follow up from that procedure, he is recommending:    Will recommend repeat ERCP in 4 weeks to re-evaluate   biliary tree prior to removing the stents     Your healthcare is important to us. We have tried to contact you by phone several times but have been unable to reach you. The stents placed during your ERCP do need follow up. Please contact us so that we can organize a a repeat procedure.     María Robledo, RN Care Coordinator  Dr. Eisenberg, Dr. Marie & Dr. Stapleton  [facility]  [phone]

## 2021-04-01 LAB
BACTERIA SPEC CULT: NO GROWTH
BACTERIA SPEC CULT: NO GROWTH
BACTERIA SPEC CULT: NORMAL
BACTERIA SPEC CULT: NORMAL
Lab: NORMAL
Lab: NORMAL
SPECIMEN SOURCE: NORMAL

## 2021-04-02 LAB
BACTERIA SPEC CULT: NO GROWTH
BACTERIA SPEC CULT: NO GROWTH
SPECIMEN SOURCE: NORMAL
SPECIMEN SOURCE: NORMAL

## 2021-04-06 ENCOUNTER — PATIENT OUTREACH (OUTPATIENT)
Dept: SURGERY | Facility: CLINIC | Age: 56
End: 2021-04-06

## 2021-04-06 NOTE — PROGRESS NOTES
Pre Visit Call and Assessment    Date of call:  04/06/2021    Phone numbers:  Home number on file [phone] (home)    Reached patient/confirmed appointment:  No - left message:   on cell phone  Patient care team/Primary provider:  No Ref-Primary, Physician  Preferred outpatient pharmacy:    [id number] - [place], [place] - [address] [place] [address]  Phone: [phone] Fax: [phone]    Referred to:  Dr. Jesús Colón    Reason for visit:  Post op

## 2021-04-22 LAB
FUNGUS SPEC CULT: NORMAL
FUNGUS SPEC CULT: NORMAL
SPECIMEN SOURCE: NORMAL
SPECIMEN SOURCE: NORMAL

## 2021-05-03 ENCOUNTER — PATIENT OUTREACH (OUTPATIENT)
Dept: GASTROENTEROLOGY | Facility: CLINIC | Age: 56
End: 2021-05-03

## 2021-05-03 ENCOUNTER — PREP FOR PROCEDURE (OUTPATIENT)
Dept: GASTROENTEROLOGY | Facility: CLINIC | Age: 56
End: 2021-05-03

## 2021-05-03 DIAGNOSIS — Z11.59 ENCOUNTER FOR SCREENING FOR OTHER VIRAL DISEASES: Primary | ICD-10-CM

## 2021-05-03 DIAGNOSIS — K83.9 BILE LEAK: Primary | ICD-10-CM

## 2021-05-03 NOTE — TELEPHONE ENCOUNTER
Pt called, reviewed need for procedure. Says he works nights, sleeps during the day. Agreed for procedure on 21    Says has visit with Dr Han tomorrow, , advised to ask for this visit to be a preop.      Called to discuss with patient. Explained they can expect a call from  for date and time of procedure, will need a , someone to stay with them for 24 hours and should stay in town for 24 hours (within 45 min of Hospital) post procedure** Reviewed this in detail with patient, patient confirmed that he has someone to stay with them**    Please assist in scheduling:     Procedure/Imaging/Clinic: ERCP  Physician: Dr. Stapleton  Timin/12  Procedure length:90 min  Anesthesia:general  Dx: bile leak  Tier:2  Location: [facility]       Comments:         If you do not get a preop physical, your procedure could be cancelled, patient voiced understanding* Follow up with Dr Fidel Han today    Preop Plan: Dr Han, 21    Med Review    Blood thinner -  no  ASA - no  Diabetic - no    COVID test discussed:orders in    Patient Education r/t procedure:done    Does patient have any history of gastric bypass/gastric surgery/altered panc/bili anatomy?no    A pre-op nurse will call 1-2 days prior to the procedure. Is advised to be NPO/no solid food 8 hours before the procedure. Ok to drink clear liquids (Water, Apple Juice or Gatorade) up to 2 hours prior to procedure.     Other specific details/comments:no     Verbalized understanding of all instructions. All questions answered.

## 2021-05-04 PROBLEM — K83.9 BILE LEAK: Status: ACTIVE | Noted: 2021-05-04

## 2021-05-09 ENCOUNTER — HOSPITAL ENCOUNTER (OUTPATIENT)
Dept: LAB | Facility: CLINIC | Age: 56
Discharge: HOME | End: 2021-05-09
Admitting: INTERNAL MEDICINE
Payer: COMMERCIAL

## 2021-05-09 DIAGNOSIS — Z11.59 ENCOUNTER FOR SCREENING FOR OTHER VIRAL DISEASES: ICD-10-CM

## 2021-05-09 LAB
SARS-COV-2 RNA RESP QL NAA+PROBE: NORMAL
SPECIMEN SOURCE: NORMAL

## 2021-05-09 PROCEDURE — U0003 INFECTIOUS AGENT DETECTION BY NUCLEIC ACID (DNA OR RNA); SEVERE ACUTE RESPIRATORY SYNDROME CORONAVIRUS 2 (SARS-COV-2) (CORONAVIRUS DISEASE [COVID-19]), AMPLIFIED PROBE TECHNIQUE, MAKING USE OF HIGH THROUGHPUT TECHNOLOGIES AS DESCRIBED BY CMS-2020-01-R: HCPCS | Performed by: INTERNAL MEDICINE

## 2021-05-09 PROCEDURE — U0005 INFEC AGEN DETEC AMPLI PROBE: HCPCS | Performed by: INTERNAL MEDICINE

## 2021-05-10 LAB
LABORATORY COMMENT REPORT: NORMAL
SARS-COV-2 RNA RESP QL NAA+PROBE: NEGATIVE
SPECIMEN SOURCE: NORMAL

## 2021-05-12 ENCOUNTER — APPOINTMENT (OUTPATIENT)
Dept: GENERAL RADIOLOGY | Facility: CLINIC | Age: 56
End: 2021-05-12
Attending: INTERNAL MEDICINE
Payer: COMMERCIAL

## 2021-05-12 ENCOUNTER — ANESTHESIA EVENT (OUTPATIENT)
Dept: SURGERY | Facility: CLINIC | Age: 56
End: 2021-05-12
Payer: COMMERCIAL

## 2021-05-12 ENCOUNTER — HOSPITAL ENCOUNTER (OUTPATIENT)
Facility: CLINIC | Age: 56
Discharge: HOME | End: 2021-05-12
Attending: INTERNAL MEDICINE | Admitting: INTERNAL MEDICINE
Payer: COMMERCIAL

## 2021-05-12 ENCOUNTER — ANESTHESIA (OUTPATIENT)
Dept: SURGERY | Facility: CLINIC | Age: 56
End: 2021-05-12
Payer: COMMERCIAL

## 2021-05-12 VITALS
BODY MASS INDEX: 34.23 KG/M2 | TEMPERATURE: 98.1 F | HEIGHT: 66 IN | WEIGHT: 212.96 LBS | OXYGEN SATURATION: 99 % | DIASTOLIC BLOOD PRESSURE: 96 MMHG | HEART RATE: 79 BPM | SYSTOLIC BLOOD PRESSURE: 165 MMHG | RESPIRATION RATE: 16 BRPM

## 2021-05-12 DIAGNOSIS — K83.9 BILE LEAK: ICD-10-CM

## 2021-05-12 LAB
ALBUMIN SERPL-MCNC: 4.1 G/DL (ref 3.4–5)
ALP SERPL-CCNC: 113 U/L (ref 40–150)
ALT SERPL W P-5'-P-CCNC: 19 U/L (ref 0–70)
AMYLASE SERPL-CCNC: 187 U/L (ref 30–110)
ANION GAP SERPL CALCULATED.3IONS-SCNC: 5 MMOL/L (ref 3–14)
AST SERPL W P-5'-P-CCNC: 18 U/L (ref 0–45)
BILIRUB SERPL-MCNC: 0.3 MG/DL (ref 0.2–1.3)
BUN SERPL-MCNC: 21 MG/DL (ref 7–30)
CALCIUM SERPL-MCNC: 9.6 MG/DL (ref 8.5–10.1)
CHLORIDE SERPL-SCNC: 105 MMOL/L (ref 94–109)
CO2 SERPL-SCNC: 29 MMOL/L (ref 20–32)
CREAT SERPL-MCNC: 0.79 MG/DL (ref 0.66–1.25)
ERCP: NORMAL
ERYTHROCYTE [DISTWIDTH] IN BLOOD BY AUTOMATED COUNT: 16.3 % (ref 10–15)
GFR SERPL CREATININE-BSD FRML MDRD: >90 ML/MIN/{1.73_M2}
GLUCOSE BLDC GLUCOMTR-MCNC: 110 MG/DL (ref 70–99)
GLUCOSE SERPL-MCNC: 113 MG/DL (ref 70–99)
HCT VFR BLD AUTO: 36.6 % (ref 40–53)
HGB BLD-MCNC: 11.5 G/DL (ref 13.3–17.7)
INR PPP: 0.99 (ref 0.86–1.14)
LIPASE SERPL-CCNC: 256 U/L (ref 73–393)
MCH RBC QN AUTO: 27.8 PG (ref 26.5–33)
MCHC RBC AUTO-ENTMCNC: 31.4 G/DL (ref 31.5–36.5)
MCV RBC AUTO: 89 FL (ref 78–100)
PLATELET # BLD AUTO: 228 10E9/L (ref 150–450)
POTASSIUM SERPL-SCNC: 3.8 MMOL/L (ref 3.4–5.3)
PROT SERPL-MCNC: 8.8 G/DL (ref 6.8–8.8)
RBC # BLD AUTO: 4.13 10E12/L (ref 4.4–5.9)
SODIUM SERPL-SCNC: 139 MMOL/L (ref 133–144)
WBC # BLD AUTO: 8.2 10E9/L (ref 4–11)

## 2021-05-12 PROCEDURE — 83690 ASSAY OF LIPASE: CPT | Performed by: STUDENT IN AN ORGANIZED HEALTH CARE EDUCATION/TRAINING PROGRAM

## 2021-05-12 PROCEDURE — 999N000179 XR SURGERY CARM FLUORO LESS THAN 5 MIN W STILLS: Mod: TC

## 2021-05-12 PROCEDURE — C1726 CATH, BAL DIL, NON-VASCULAR: HCPCS | Performed by: INTERNAL MEDICINE

## 2021-05-12 PROCEDURE — 272N000001 HC OR GENERAL SUPPLY STERILE: Performed by: INTERNAL MEDICINE

## 2021-05-12 PROCEDURE — 710N000012 HC RECOVERY PHASE 2, PER MINUTE: Performed by: INTERNAL MEDICINE

## 2021-05-12 PROCEDURE — 85610 PROTHROMBIN TIME: CPT | Performed by: STUDENT IN AN ORGANIZED HEALTH CARE EDUCATION/TRAINING PROGRAM

## 2021-05-12 PROCEDURE — 85027 COMPLETE CBC AUTOMATED: CPT | Performed by: STUDENT IN AN ORGANIZED HEALTH CARE EDUCATION/TRAINING PROGRAM

## 2021-05-12 PROCEDURE — 370N000017 HC ANESTHESIA TECHNICAL FEE, PER MIN: Performed by: INTERNAL MEDICINE

## 2021-05-12 PROCEDURE — 360N000075 HC SURGERY LEVEL 2, PER MIN: Performed by: INTERNAL MEDICINE

## 2021-05-12 PROCEDURE — 255N000002 HC RX 255 OP 636: Performed by: INTERNAL MEDICINE

## 2021-05-12 PROCEDURE — 250N000011 HC RX IP 250 OP 636: Performed by: NURSE ANESTHETIST, CERTIFIED REGISTERED

## 2021-05-12 PROCEDURE — 250N000025 HC SEVOFLURANE, PER MIN: Performed by: INTERNAL MEDICINE

## 2021-05-12 PROCEDURE — 82150 ASSAY OF AMYLASE: CPT | Performed by: STUDENT IN AN ORGANIZED HEALTH CARE EDUCATION/TRAINING PROGRAM

## 2021-05-12 PROCEDURE — 250N000009 HC RX 250: Performed by: STUDENT IN AN ORGANIZED HEALTH CARE EDUCATION/TRAINING PROGRAM

## 2021-05-12 PROCEDURE — 250N000011 HC RX IP 250 OP 636: Performed by: ANESTHESIOLOGY

## 2021-05-12 PROCEDURE — 250N000009 HC RX 250: Performed by: NURSE ANESTHETIST, CERTIFIED REGISTERED

## 2021-05-12 PROCEDURE — C1769 GUIDE WIRE: HCPCS | Performed by: INTERNAL MEDICINE

## 2021-05-12 PROCEDURE — 82962 GLUCOSE BLOOD TEST: CPT

## 2021-05-12 PROCEDURE — 999N000141 HC STATISTIC PRE-PROCEDURE NURSING ASSESSMENT: Performed by: INTERNAL MEDICINE

## 2021-05-12 PROCEDURE — 258N000003 HC RX IP 258 OP 636: Performed by: NURSE ANESTHETIST, CERTIFIED REGISTERED

## 2021-05-12 PROCEDURE — 80053 COMPREHEN METABOLIC PANEL: CPT | Performed by: STUDENT IN AN ORGANIZED HEALTH CARE EDUCATION/TRAINING PROGRAM

## 2021-05-12 PROCEDURE — 710N000010 HC RECOVERY PHASE 1, LEVEL 2, PER MIN: Performed by: INTERNAL MEDICINE

## 2021-05-12 PROCEDURE — 250N000009 HC RX 250: Performed by: INTERNAL MEDICINE

## 2021-05-12 RX ORDER — ONDANSETRON 4 MG/1
4 TABLET, ORALLY DISINTEGRATING ORAL EVERY 30 MIN PRN
Status: DISCONTINUED | OUTPATIENT
Start: 2021-05-12 | End: 2021-05-12 | Stop reason: HOSPADM

## 2021-05-12 RX ORDER — HYDROMORPHONE HYDROCHLORIDE 1 MG/ML
.3-.5 INJECTION, SOLUTION INTRAMUSCULAR; INTRAVENOUS; SUBCUTANEOUS EVERY 5 MIN PRN
Status: DISCONTINUED | OUTPATIENT
Start: 2021-05-12 | End: 2021-05-12 | Stop reason: HOSPADM

## 2021-05-12 RX ORDER — SODIUM CHLORIDE, SODIUM LACTATE, POTASSIUM CHLORIDE, CALCIUM CHLORIDE 600; 310; 30; 20 MG/100ML; MG/100ML; MG/100ML; MG/100ML
INJECTION, SOLUTION INTRAVENOUS CONTINUOUS PRN
Status: DISCONTINUED | OUTPATIENT
Start: 2021-05-12 | End: 2021-05-12

## 2021-05-12 RX ORDER — FENTANYL CITRATE 50 UG/ML
25-50 INJECTION, SOLUTION INTRAMUSCULAR; INTRAVENOUS
Status: DISCONTINUED | OUTPATIENT
Start: 2021-05-12 | End: 2021-05-12 | Stop reason: HOSPADM

## 2021-05-12 RX ORDER — ONDANSETRON 2 MG/ML
INJECTION INTRAMUSCULAR; INTRAVENOUS PRN
Status: DISCONTINUED | OUTPATIENT
Start: 2021-05-12 | End: 2021-05-12

## 2021-05-12 RX ORDER — NALOXONE HYDROCHLORIDE 0.4 MG/ML
0.4 INJECTION, SOLUTION INTRAMUSCULAR; INTRAVENOUS; SUBCUTANEOUS
Status: DISCONTINUED | OUTPATIENT
Start: 2021-05-12 | End: 2021-05-12 | Stop reason: HOSPADM

## 2021-05-12 RX ORDER — FENTANYL CITRATE 50 UG/ML
INJECTION, SOLUTION INTRAMUSCULAR; INTRAVENOUS PRN
Status: DISCONTINUED | OUTPATIENT
Start: 2021-05-12 | End: 2021-05-12

## 2021-05-12 RX ORDER — ONDANSETRON 2 MG/ML
4 INJECTION INTRAMUSCULAR; INTRAVENOUS EVERY 30 MIN PRN
Status: DISCONTINUED | OUTPATIENT
Start: 2021-05-12 | End: 2021-05-12 | Stop reason: HOSPADM

## 2021-05-12 RX ORDER — NALOXONE HYDROCHLORIDE 0.4 MG/ML
0.2 INJECTION, SOLUTION INTRAMUSCULAR; INTRAVENOUS; SUBCUTANEOUS
Status: DISCONTINUED | OUTPATIENT
Start: 2021-05-12 | End: 2021-05-12 | Stop reason: HOSPADM

## 2021-05-12 RX ORDER — LIDOCAINE HYDROCHLORIDE 20 MG/ML
INJECTION, SOLUTION INFILTRATION; PERINEURAL PRN
Status: DISCONTINUED | OUTPATIENT
Start: 2021-05-12 | End: 2021-05-12

## 2021-05-12 RX ORDER — LIDOCAINE 40 MG/G
CREAM TOPICAL
Status: DISCONTINUED | OUTPATIENT
Start: 2021-05-12 | End: 2021-05-12 | Stop reason: HOSPADM

## 2021-05-12 RX ORDER — IOPAMIDOL 510 MG/ML
INJECTION, SOLUTION INTRAVASCULAR PRN
Status: DISCONTINUED | OUTPATIENT
Start: 2021-05-12 | End: 2021-05-12 | Stop reason: HOSPADM

## 2021-05-12 RX ORDER — INDOMETHACIN 50 MG/1
100 SUPPOSITORY RECTAL
Status: COMPLETED | OUTPATIENT
Start: 2021-05-12 | End: 2021-05-12

## 2021-05-12 RX ORDER — PROPOFOL 10 MG/ML
INJECTION, EMULSION INTRAVENOUS PRN
Status: DISCONTINUED | OUTPATIENT
Start: 2021-05-12 | End: 2021-05-12

## 2021-05-12 RX ORDER — EPHEDRINE SULFATE 50 MG/ML
INJECTION, SOLUTION INTRAMUSCULAR; INTRAVENOUS; SUBCUTANEOUS PRN
Status: DISCONTINUED | OUTPATIENT
Start: 2021-05-12 | End: 2021-05-12

## 2021-05-12 RX ORDER — DEXAMETHASONE SODIUM PHOSPHATE 4 MG/ML
INJECTION, SOLUTION INTRA-ARTICULAR; INTRALESIONAL; INTRAMUSCULAR; INTRAVENOUS; SOFT TISSUE PRN
Status: DISCONTINUED | OUTPATIENT
Start: 2021-05-12 | End: 2021-05-12

## 2021-05-12 RX ORDER — LEVOFLOXACIN 5 MG/ML
INJECTION, SOLUTION INTRAVENOUS PRN
Status: DISCONTINUED | OUTPATIENT
Start: 2021-05-12 | End: 2021-05-12

## 2021-05-12 RX ORDER — FLUMAZENIL 0.1 MG/ML
0.2 INJECTION, SOLUTION INTRAVENOUS
Status: DISCONTINUED | OUTPATIENT
Start: 2021-05-12 | End: 2021-05-12 | Stop reason: HOSPADM

## 2021-05-12 RX ADMIN — MIDAZOLAM 2 MG: 1 INJECTION INTRAMUSCULAR; INTRAVENOUS at 09:36

## 2021-05-12 RX ADMIN — LEVOFLOXACIN 500 MG: 5 INJECTION, SOLUTION INTRAVENOUS at 10:07

## 2021-05-12 RX ADMIN — PHENYLEPHRINE HYDROCHLORIDE 100 MCG: 10 INJECTION INTRAVENOUS at 10:05

## 2021-05-12 RX ADMIN — ONDANSETRON 4 MG: 2 INJECTION INTRAMUSCULAR; INTRAVENOUS at 10:15

## 2021-05-12 RX ADMIN — PHENYLEPHRINE HYDROCHLORIDE 100 MCG: 10 INJECTION INTRAVENOUS at 10:10

## 2021-05-12 RX ADMIN — Medication 10 MG: at 10:16

## 2021-05-12 RX ADMIN — SODIUM CHLORIDE, POTASSIUM CHLORIDE, SODIUM LACTATE AND CALCIUM CHLORIDE: 600; 310; 30; 20 INJECTION, SOLUTION INTRAVENOUS at 09:36

## 2021-05-12 RX ADMIN — PHENYLEPHRINE HYDROCHLORIDE 300 MCG: 10 INJECTION INTRAVENOUS at 10:16

## 2021-05-12 RX ADMIN — LIDOCAINE HYDROCHLORIDE 100 MG: 20 INJECTION, SOLUTION INFILTRATION; PERINEURAL at 09:52

## 2021-05-12 RX ADMIN — FENTANYL CITRATE 50 MCG: 50 INJECTION, SOLUTION INTRAMUSCULAR; INTRAVENOUS at 11:35

## 2021-05-12 RX ADMIN — FENTANYL CITRATE 25 MCG: 50 INJECTION, SOLUTION INTRAMUSCULAR; INTRAVENOUS at 11:25

## 2021-05-12 RX ADMIN — Medication 10 MG: at 10:32

## 2021-05-12 RX ADMIN — PHENYLEPHRINE HYDROCHLORIDE 100 MCG: 10 INJECTION INTRAVENOUS at 10:13

## 2021-05-12 RX ADMIN — PHENYLEPHRINE HYDROCHLORIDE 50 MCG: 10 INJECTION INTRAVENOUS at 09:52

## 2021-05-12 RX ADMIN — PROPOFOL 200 MG: 10 INJECTION, EMULSION INTRAVENOUS at 09:52

## 2021-05-12 RX ADMIN — ROCURONIUM BROMIDE 100 MG: 10 INJECTION INTRAVENOUS at 09:52

## 2021-05-12 RX ADMIN — SUGAMMADEX 200 MG: 100 INJECTION, SOLUTION INTRAVENOUS at 10:33

## 2021-05-12 RX ADMIN — DEXAMETHASONE SODIUM PHOSPHATE 8 MG: 4 INJECTION, SOLUTION INTRA-ARTICULAR; INTRALESIONAL; INTRAMUSCULAR; INTRAVENOUS; SOFT TISSUE at 10:10

## 2021-05-12 RX ADMIN — FENTANYL CITRATE 100 MCG: 50 INJECTION, SOLUTION INTRAMUSCULAR; INTRAVENOUS at 10:01

## 2021-05-12 ASSESSMENT — MIFFLIN-ST. JEOR: SCORE: 1738.75

## 2021-05-12 NOTE — ANESTHESIA CARE TRANSFER NOTE
Patient: Nico Coello    Procedure(s):  ENDOSCOPIC RETROGRADE CHOLANGIOPANCREATOGRAPHY WITH STENT REMOVAL AND BILIARY SLUDGE REMOVAL    Diagnosis: Bile leak [K83.9]  Diagnosis Additional Information: No value filed.    Anesthesia Type:   No value filed.     Note:    Oropharynx: oropharynx clear of all foreign objects  Level of Consciousness: awake  Oxygen Supplementation: nasal cannula  Level of Supplemental Oxygen (L/min / FiO2): 3 LPM  Independent Airway: airway patency satisfactory and stable  Dentition: dentition unchanged  Vital Signs Stable: post-procedure vital signs reviewed and stable  Report to RN Given: handoff report given  Patient transferred to: PACU    Handoff Report: Identifed the Patient, Identified the Reponsible Provider, Reviewed the pertinent medical history, Discussed the surgical course, Reviewed Intra-OP anesthesia mangement and issues during anesthesia, Set expectations for post-procedure period and Allowed opportunity for questions and acknowledgement of understanding      Vitals: (Last set prior to Anesthesia Care Transfer)  CRNA VITALS  5/12/2021 1014 - 5/12/2021 1049      5/12/2021             Pulse:  88    SpO2:  100 %    Resp Rate (observed):  (!) 3        Electronically Signed By: LNIDA Pierre CRNA  May 12, 2021  10:49 AM

## 2021-05-12 NOTE — DISCHARGE INSTRUCTIONS
[facility], [place]  Same-Day Surgery ERCP Procedure   Adult Discharge Orders & Instructions     You had a procedure known as an Endoscopic Retrograde Cholangiopancreatography (ERCP). Your healthcare provider performed the ERCP to look at your bile or pancreatic ducts, and to locate and/or treat blockages (dilation, stenting, removal, etc.) in these ducts. Often biopsies, small samples of tissue, are taken to help diagnose and/or classify stages of disease growth. This procedure is used to diagnose diseases of the pancreas, bile ducts, pancreatic duct, liver, and gallbladder.     After your procedure   1. Make sure to clarify with your healthcare provider any diet restrictions (For example, clear liquid, low fat, no caffeine, etc.)   2. Do NOT take aspirin containing medications or any other blood-thinning medicines (anticoagulants) until your healthcare provider says it's OK.   3. You MAY be prescribed antibiotics, depending on what was done and/or found during your EUS, make sure to take antibiotics as prescribed by your healthcare provider    For 24 hours after surgery  1. Get plenty of rest.  A responsible adult must stay with you for at least 24 hours after you leave the hospital.   2. Do not drive or use heavy equipment.  If you have weakness or tingling, don't drive or use heavy equipment until this feeling goes away.  3. Do not drink alcohol.  4. Avoid strenuous or risky activities (gym, yoga, cycling, etc.).  Ask for help when climbing stairs.   5. You may feel lightheaded.  IF so, sit for a few minutes before standing.  Have someone help you get up.   6. If you have nausea (feel sick to your stomach): Drink only clear liquids such as apple juice, ginger ale, broth or 7-Up.  Rest may also help.  Be sure to drink enough fluids.  Move to a regular diet as you feel able.  7. If you feel bloated or have too much gas, use a heating pad on your belly to help reduce the discomfort.  This should help you feel better.   8. You may have a slight fever. This is normal for the first 24 hours.   9. You may have a dry mouth, a sore throat, muscle aches or trouble sleeping.  These are normal and will go away after 24 hours. A sore throat is most common. Use lozenges or gargle with salt water to ease the discomfort.   10. Do not make important or legal decisions.      Call your doctor for any of the followin. Chest pain, and/or shortness of breath  2. Abdominal  pain, bloating or cramping that has not improved or does not respond to pain reliving medications (Tylenol or narcotics if prescribed)   3. Difficulty swallowing or feeling as though food or liquids are stuck in your throat   4. Sore throat lasting more than 2 days or pain that has worsened over time   5. Black or tarry stools   6. Nausea and/or vomiting that is not resolving or has not responded to anti-nausea medications prescribed to you   7. It has been over 8 to 10 hours since surgery and you are still not able to urinate (pass water)   8. Headache for over 24 hours   9. Fever over 100.5 F (38 C) lasting more than 24 hours after the procedure   10. Signs of jaundice or blockage (fever, chills, abdominal pain, yellowing of the whites of your eyes, yellowing of your skin, and/or passing darker than normal urine)     To contact a doctor, call:   [ ] Dr. Guru Stapleton @ [phone] (GI Clinic) or [phone] (Monday thru Friday 8:00am to 4:30pm)   [ ] [phone] and ask for the Gastroenterology resident on call (answered 24 hours a day)   [ ] Emergency Department: [facility]: [phone]     Take it easy when you get home.  Remember, same day surgery DOES NOT MEAN SAME DAY RECOVERY!  Healing is a gradual process.  You will need some time to recover - you may be more tired than you realize at first.  Rest and relax for at least the first 24 hours at home.  You'll feel better and heal faster if you take good care of  yourself.     Impression:          - Previous biliary and pancreatic stents were removed.                        - Occlusion cholangiogram showed no further post                        cholecystectomy leak. Gall bladder remnant and cystic                        duct was seen.                        - Stent related sludge was removed   Recommendation:      - Observe patient in same day observation unit for                        ongoing care.                        - No further ERCP is anticipated                        - If patient has further attacks of pain, will recommend                        clinic visit with referring provider

## 2021-05-12 NOTE — OR NURSING
"Pt's BP still high, see flow sheet. He is asymptomatic with this BP. He said he did not take his BP medication this morning. He said,\"My BP will go down once I take my pills when I get home\". Dr. Talbot updated. No new orders at this time.  Pt is up independently,pain and nausea free, voiding with no issues.  Discharge teaching completed over the phone with Héctor,pt's friend,the responsible adult for discharge. Questions encouraged and answered.  "

## 2021-05-12 NOTE — ANESTHESIA PROCEDURE NOTES
Airway       Patient location during procedure: OR       Procedure Start/Stop Times: 5/12/2021 9:52 AM and 5/12/2021 9:53 AM  Staff -        CRNA: Cristian Santiago APRN CRNA       Performed By: CRNA  Consent for Airway        Urgency: elective  Indications and Patient Condition       Indications for airway management: tom-procedural       Induction type:intravenous       Mask difficulty assessment: 1 - vent by mask    Final Airway Details       Final airway type: endotracheal airway       Successful airway: ETT - single  Endotracheal Airway Details        ETT size (mm): 7.5       Cuffed: yes       Successful intubation technique: direct laryngoscopy       DL Blade Type: Patton 2       Grade View of Cords: 2       Adjucts: stylet       Position: Right       Measured from: gums/teeth       Secured at (cm): 23       Bite block used: None    Post intubation assessment        Placement verified by: capnometry, equal breath sounds and chest rise        Number of attempts at approach: 1       Number of other approaches attempted: 0       Secured with: pink tape       Ease of procedure: easy       Dentition: Intact    Medication(s) Administered   Medication Administration Time: 5/12/2021 9:53 AM

## 2021-05-12 NOTE — OR NURSING
Dr. Stapleton will see pt  Before discharge. Pt has questions for him. Dr. Talbot will put in a sign-out later. Pt may go to Phase II.

## 2021-05-17 ENCOUNTER — PATIENT OUTREACH (OUTPATIENT)
Dept: GASTROENTEROLOGY | Facility: CLINIC | Age: 56
End: 2021-05-17

## 2021-05-17 NOTE — TELEPHONE ENCOUNTER
Post ERCP (5/12/21) with Dr. Stapleton: Follow-up    Post procedure recommendations:   No further ERCP is anticipated     - If patient has further attacks of pain, will recommend  clinic visit with referring provider    Orders placed: none    Patient states: called, unable to connect    Clinic contact and scheduling numbers verified for future questions/concerns.    María Robledo RN Care Coordinator

## 2022-03-24 NOTE — ANESTHESIA PROCEDURE NOTES
Airway       Patient location during procedure: OR  Staff -        Anesthesiologist:  Behrens, Christopher J, MD       CRNA: Trixie Keller APRN CRNA       Performed By: CRNA  Consent for Airway        Urgency: elective  Indications and Patient Condition       Indications for airway management: tom-procedural       Induction type:intravenous       Mask difficulty assessment: 1 - vent by mask    Final Airway Details       Final airway type: endotracheal airway       Successful airway: ETT - single  Endotracheal Airway Details        ETT size (mm): 7.0       Cuffed: yes       Successful intubation technique: direct laryngoscopy       DL Blade Type: Patton 2       Grade View of Cords: 1       Adjucts: stylet       Position: Right       Measured from: lips       Secured at (cm): 23       Bite block used: None    Post intubation assessment        Placement verified by: capnometry, equal breath sounds and chest rise        Number of attempts at approach: 1       Secured with: pink tape       Ease of procedure: easy       Dentition: Intact and Unchanged    Medication(s) Administered   Medication Administration Time: 3/25/2021 8:35 AM             Zheng Garsia is a 53year old male with a history of cigarette smoking who presents to the ED with a STEMI alert activated by EMS. He has been working outdoors landscaping and around 10am this morning he began to experience chest pain. EKG per squad shows inferior STEMI. Patient has no history of this in the past. He has a positive family history of CAD. He does not routinely see a doctor, and denies history of HTN, diabetes or high cholesterol. He is awake and alert at the time of arrival. Dr. Kimmie Mendez and Dr. Fuad Karimi have both seen the EKG. Cath team is standing by in the room. There were no vitals taken for this visit. I have reviewed the following from the nursing documentation:      Prior to Admission medications    Not on File       Allergies as of 03/24/2022    (No Known Allergies)       No past medical history on file. Surgical History: No past surgical history on file. Family History:  No family history on file. Social History     Socioeconomic History    Marital status: Unknown     Spouse name: Not on file    Number of children: Not on file    Years of education: Not on file    Highest education level: Not on file   Occupational History    Not on file   Tobacco Use    Smoking status: Not on file    Smokeless tobacco: Not on file   Substance and Sexual Activity    Alcohol use: Not on file    Drug use: Not on file    Sexual activity: Not on file   Other Topics Concern    Not on file   Social History Narrative    Not on file     Social Determinants of Health     Financial Resource Strain:     Difficulty of Paying Living Expenses: Not on file   Food Insecurity:     Worried About Running Out of Food in the Last Year: Not on file    Catarina of Food in the Last Year: Not on file   Transportation Needs:     Lack of Transportation (Medical): Not on file    Lack of Transportation (Non-Medical):  Not on file   Physical Activity:     Days of Exercise per Week: Not on file    Minutes of Exercise per Session: Not on file   Stress:     Feeling of Stress : Not on file   Social Connections:     Frequency of Communication with Friends and Family: Not on file    Frequency of Social Gatherings with Friends and Family: Not on file    Attends Restorationist Services: Not on file    Active Member of Clubs or Organizations: Not on file    Attends Club or Organization Meetings: Not on file    Marital Status: Not on file   Intimate Partner Violence:     Fear of Current or Ex-Partner: Not on file    Emotionally Abused: Not on file    Physically Abused: Not on file    Sexually Abused: Not on file   Housing Stability:     Unable to Pay for Housing in the Last Year: Not on file    Number of [place] in the Last Year: Not on file    Unstable Housing in the Last Year: Not on file          Review of Systems   Constitutional: Negative for activity change, appetite change, chills, diaphoresis, fatigue and fever. HENT: Negative. Negative for congestion, dental problem, ear pain, facial swelling, rhinorrhea, sinus pressure, sneezing, sore throat, tinnitus, trouble swallowing and voice change. Eyes: Negative for photophobia, pain, discharge, redness, itching and visual disturbance. Respiratory: Negative for cough, chest tightness, shortness of breath, wheezing and stridor. Cardiovascular: Positive for chest pain. Negative for palpitations and leg swelling. Gastrointestinal: Negative for abdominal distention, abdominal pain, anal bleeding, blood in stool, constipation, diarrhea, nausea and vomiting. Genitourinary: Negative for difficulty urinating, dysuria, frequency, hematuria, penile discharge, testicular pain and urgency. Musculoskeletal: Negative for back pain, joint swelling, neck pain and neck stiffness. Skin: Negative for rash and wound. Neurological: Negative for dizziness, syncope, facial asymmetry, speech difficulty, weakness, numbness and headaches.    Hematological: Does not bruise/bleed easily. Psychiatric/Behavioral: Negative for agitation, confusion, hallucinations, self-injury, sleep disturbance and suicidal ideas. The patient is not nervous/anxious. All other systems reviewed and are negative. Physical Exam  Vitals and nursing note reviewed. Constitutional:       General: He is not in acute distress. Appearance: He is well-developed. HENT:      Head: Normocephalic and atraumatic. Right Ear: External ear normal.      Left Ear: External ear normal.      Nose: Nose normal.      Mouth/Throat:      Pharynx: No oropharyngeal exudate. Eyes:      General: No scleral icterus. Right eye: No discharge. Left eye: No discharge. Conjunctiva/sclera: Conjunctivae normal.      Pupils: Pupils are equal, round, and reactive to light. Neck:      Vascular: No JVD. Trachea: No tracheal deviation. Cardiovascular:      Rate and Rhythm: Normal rate and regular rhythm. Heart sounds: Normal heart sounds. No murmur heard. No friction rub. No gallop. Pulmonary:      Effort: Pulmonary effort is normal. No respiratory distress. Breath sounds: Normal breath sounds. No wheezing or rales. Abdominal:      General: Bowel sounds are normal. There is no distension. Palpations: Abdomen is soft. There is no mass. Tenderness: There is no abdominal tenderness. There is no guarding or rebound. Musculoskeletal:         General: No tenderness. Normal range of motion. Cervical back: Normal range of motion and neck supple. Lymphadenopathy:      Cervical: No cervical adenopathy. Skin:     General: Skin is warm and dry. Capillary Refill: Capillary refill takes less than 2 seconds. Coloration: Skin is not pale. Findings: No erythema or rash. Neurological:      General: No focal deficit present. Mental Status: He is alert and oriented to person, place, and time. Cranial Nerves: No cranial nerve deficit. Hematocrit 36.3 (L) 40.5 - 52.5 %    MCV 93.8 80.0 - 100.0 fL    MCH 31.6 26.0 - 34.0 pg    MCHC 33.7 31.0 - 36.0 g/dL    RDW 12.8 12.4 - 15.4 %    Platelets 697 889 - 591 K/uL    MPV 8.3 5.0 - 10.5 fL   Troponin   Result Value Ref Range    Troponin <0.01 <0.01 ng/mL   TYPE AND SCREEN   Result Value Ref Range    ABO/Rh A POS     Antibody Screen NEG        I spoke with Dr. Bobbi Mitchell. We thoroughly discussed the history, physical exam, laboratory and imaging studies, as well as, emergency department course. Based upon that discussion, we've decided to admit Patricia Gambino for further observation and evaluation of [address] STEMI. As I have deemed necessary from their history, physical, and studies, I have considered and evaluated Patricia Gambino for the following diagnoses:        FINAL IMPRESSION  1. ST elevation myocardial infarction (STEMI), unspecified artery ([address]        Vitals:  Blood pressure 120/74, pulse 88, temperature 97.6 °F (36.4 °C), temperature source Oral, resp. rate 8, height 5' 11\" (1.803 m), weight 197 lb 15.6 oz (89.8 kg), SpO2 99 %. Radiology  XR CHEST PORTABLE    Result Date: 3/24/2022  No acute cardiopulmonary disease. EKG Interpretation. The Ekg interpreted by me in the absence of a cardiologist shows. normal sinus rhythm with a rate of 79 with baseline artifact present  Axis is   Normal  QTc is  within an acceptable range  Intervals and Durations are unremarkable. Inferior STEMI is demonstrated in leads II, III, AVF.           Hugh Torres MD  03/24/22 1557

## 2025-06-17 ENCOUNTER — APPOINTMENT (OUTPATIENT)
Dept: CT IMAGING | Facility: CLINIC | Age: 60
End: 2025-06-17
Attending: EMERGENCY MEDICINE
Payer: COMMERCIAL

## 2025-06-17 ENCOUNTER — HOSPITAL ENCOUNTER (EMERGENCY)
Facility: CLINIC | Age: 60
Discharge: HOME | End: 2025-06-17
Attending: EMERGENCY MEDICINE | Admitting: EMERGENCY MEDICINE
Payer: COMMERCIAL

## 2025-06-17 VITALS
RESPIRATION RATE: 18 BRPM | HEIGHT: 66 IN | HEART RATE: 99 BPM | SYSTOLIC BLOOD PRESSURE: 126 MMHG | TEMPERATURE: 98.7 F | OXYGEN SATURATION: 98 % | DIASTOLIC BLOOD PRESSURE: 83 MMHG | BODY MASS INDEX: 45 KG/M2 | WEIGHT: 279.98 LBS

## 2025-06-17 DIAGNOSIS — R10.9 ABDOMINAL PAIN, UNSPECIFIED ABDOMINAL LOCATION: ICD-10-CM

## 2025-06-17 LAB
ALBUMIN SERPL BCG-MCNC: 4.3 G/DL (ref 3.5–5.2)
ALP SERPL-CCNC: 123 U/L (ref 40–150)
ALT SERPL W P-5'-P-CCNC: 17 U/L (ref 0–70)
ANION GAP SERPL CALCULATED.3IONS-SCNC: 12 MMOL/L (ref 7–15)
AST SERPL W P-5'-P-CCNC: 24 U/L (ref 0–45)
ATRIAL RATE - MUSE: 82 BPM
BASOPHILS # BLD AUTO: 0 10E3/UL (ref 0–0.2)
BASOPHILS NFR BLD AUTO: 0 %
BILIRUB SERPL-MCNC: 0.7 MG/DL
BUN SERPL-MCNC: 14.7 MG/DL (ref 8–23)
CALCIUM SERPL-MCNC: 9.7 MG/DL (ref 8.8–10.4)
CHLORIDE SERPL-SCNC: 96 MMOL/L (ref 98–107)
CREAT SERPL-MCNC: 0.74 MG/DL (ref 0.67–1.17)
DIASTOLIC BLOOD PRESSURE - MUSE: NORMAL MMHG
EGFRCR SERPLBLD CKD-EPI 2021: >90 ML/MIN/1.73M2
EOSINOPHIL # BLD AUTO: 0.3 10E3/UL (ref 0–0.7)
EOSINOPHIL NFR BLD AUTO: 2 %
ERYTHROCYTE [DISTWIDTH] IN BLOOD BY AUTOMATED COUNT: 12.6 % (ref 10–15)
GLUCOSE SERPL-MCNC: 294 MG/DL (ref 70–99)
HCO3 SERPL-SCNC: 24 MMOL/L (ref 22–29)
HCT VFR BLD AUTO: 43.7 % (ref 40–53)
HGB BLD-MCNC: 14.7 G/DL (ref 13.3–17.7)
IMM GRANULOCYTES # BLD: 0.1 10E3/UL
IMM GRANULOCYTES NFR BLD: 1 %
INTERPRETATION ECG - MUSE: NORMAL
LIPASE SERPL-CCNC: 36 U/L (ref 13–60)
LYMPHOCYTES # BLD AUTO: 3 10E3/UL (ref 0.8–5.3)
LYMPHOCYTES NFR BLD AUTO: 25 %
MCH RBC QN AUTO: 29.6 PG (ref 26.5–33)
MCHC RBC AUTO-ENTMCNC: 33.6 G/DL (ref 31.5–36.5)
MCV RBC AUTO: 88 FL (ref 78–100)
MONOCYTES # BLD AUTO: 0.8 10E3/UL (ref 0–1.3)
MONOCYTES NFR BLD AUTO: 7 %
NEUTROPHILS # BLD AUTO: 7.9 10E3/UL (ref 1.6–8.3)
NEUTROPHILS NFR BLD AUTO: 65 %
NRBC # BLD AUTO: 0 10E3/UL
NRBC BLD AUTO-RTO: 0 /100
P AXIS - MUSE: 66 DEGREES
PLATELET # BLD AUTO: 262 10E3/UL (ref 150–450)
POTASSIUM SERPL-SCNC: 4.2 MMOL/L (ref 3.4–5.3)
PR INTERVAL - MUSE: 178 MS
PROT SERPL-MCNC: 8.5 G/DL (ref 6.4–8.3)
QRS DURATION - MUSE: 150 MS
QT - MUSE: 434 MS
QTC - MUSE: 507 MS
R AXIS - MUSE: 96 DEGREES
RBC # BLD AUTO: 4.96 10E6/UL (ref 4.4–5.9)
SODIUM SERPL-SCNC: 132 MMOL/L (ref 135–145)
SYSTOLIC BLOOD PRESSURE - MUSE: NORMAL MMHG
T AXIS - MUSE: 49 DEGREES
TROPONIN T SERPL HS-MCNC: 12 NG/L
TROPONIN T SERPL HS-MCNC: 12 NG/L
VENTRICULAR RATE- MUSE: 82 BPM
WBC # BLD AUTO: 12 10E3/UL (ref 4–11)

## 2025-06-17 PROCEDURE — 83690 ASSAY OF LIPASE: CPT | Performed by: EMERGENCY MEDICINE

## 2025-06-17 PROCEDURE — 93010 ELECTROCARDIOGRAM REPORT: CPT | Performed by: EMERGENCY MEDICINE

## 2025-06-17 PROCEDURE — 250N000013 HC RX MED GY IP 250 OP 250 PS 637: Performed by: EMERGENCY MEDICINE

## 2025-06-17 PROCEDURE — 84520 ASSAY OF UREA NITROGEN: CPT | Performed by: EMERGENCY MEDICINE

## 2025-06-17 PROCEDURE — 93005 ELECTROCARDIOGRAM TRACING: CPT | Performed by: EMERGENCY MEDICINE

## 2025-06-17 PROCEDURE — 84484 ASSAY OF TROPONIN QUANT: CPT | Performed by: EMERGENCY MEDICINE

## 2025-06-17 PROCEDURE — 85004 AUTOMATED DIFF WBC COUNT: CPT | Performed by: EMERGENCY MEDICINE

## 2025-06-17 PROCEDURE — 99284 EMERGENCY DEPT VISIT MOD MDM: CPT | Performed by: EMERGENCY MEDICINE

## 2025-06-17 PROCEDURE — 74176 CT ABD & PELVIS W/O CONTRAST: CPT

## 2025-06-17 PROCEDURE — 99285 EMERGENCY DEPT VISIT HI MDM: CPT | Mod: 25 | Performed by: EMERGENCY MEDICINE

## 2025-06-17 PROCEDURE — 74176 CT ABD & PELVIS W/O CONTRAST: CPT | Mod: 26 | Performed by: RADIOLOGY

## 2025-06-17 PROCEDURE — 36415 COLL VENOUS BLD VENIPUNCTURE: CPT | Performed by: EMERGENCY MEDICINE

## 2025-06-17 RX ORDER — OMEPRAZOLE 20 MG/1
20 CAPSULE, DELAYED RELEASE ORAL 2 TIMES DAILY
Qty: 28 CAPSULE | Refills: 0 | Status: SHIPPED | OUTPATIENT
Start: 2025-06-17 | End: 2025-07-01

## 2025-06-17 RX ORDER — FAMOTIDINE 20 MG/1
20 TABLET, FILM COATED ORAL ONCE
Status: COMPLETED | OUTPATIENT
Start: 2025-06-17 | End: 2025-06-17

## 2025-06-17 RX ORDER — ACETAMINOPHEN 500 MG
1000 TABLET ORAL ONCE
Status: COMPLETED | OUTPATIENT
Start: 2025-06-17 | End: 2025-06-17

## 2025-06-17 RX ADMIN — ACETAMINOPHEN 1000 MG: 500 TABLET ORAL at 10:17

## 2025-06-17 RX ADMIN — FAMOTIDINE 20 MG: 20 TABLET, FILM COATED ORAL at 15:32

## 2025-06-17 ASSESSMENT — ACTIVITIES OF DAILY LIVING (ADL)
ADLS_ACUITY_SCORE: 51

## 2025-06-17 NOTE — ED TRIAGE NOTES
Ambulatory to triage, endorses 2/10 pain with 9/10 overnight pain RUQ; hx 3/2021 cholecystectomy      Triage Assessment (Adult)       Row Name 06/17/25 0915          Triage Assessment    Airway WDL WDL        Respiratory WDL    Respiratory WDL WDL        Skin Circulation/Temperature WDL    Skin Circulation/Temperature WDL WDL        Cardiac WDL    Cardiac WDL WDL        Peripheral/Neurovascular WDL    Peripheral Neurovascular WDL WDL        Cognitive/Neuro/Behavioral WDL    Cognitive/Neuro/Behavioral WDL WDL

## 2025-06-17 NOTE — ED PROVIDER NOTES
ED Provider Note  [facility]      History     Chief Complaint   Patient presents with    Abdominal Pain     RUQ pain 9/10      HPI  Nico Coello is a 59 year old male with a PMH of acute cholecystitis, CAITLYN, type 2 diabetes, hypertension, and schizoaffective disorder who arrives in the ED with abdominal pain.  The patient reports that ever since having his gallbladder surgery a years ago he has occasionally had pain in the right upper quadrant.  He states that typically this would not bother him and has been very intermittent.  He states that over the last couple weeks he has had some increased and more frequent pain in the right upper quadrant.  Describes it as sharp pain in the right upper quadrant that waxes and wanes.  He denies any known triggers for this pain, does not believe it to be associated with oral intake.  There is no associated nausea or vomiting.  No fevers.  He did take Aleve and that does seem to help with the pain.  Denies any changes in urination or bowel movements.    Patient also notes that he will occasionally have some left-sided chest pain every couple weeks however states that is not acutely associated with the abdominal pain.  His current chest pain         Past Medical History  No past medical history on file.  Past Surgical History:   Procedure Laterality Date    ENDOSCOPIC RETROGRADE CHOLANGIOPANCREATOGRAM N/A 3/27/2021    Procedure: ENDOSCOPIC RETROGRADE CHOLANGIOPANCREATOGRAPHY, WITH BILIARY SPGINCTEROTOMY, BALLOON DILATION,  AND STENT INSERTION;  Surgeon: Guru Torrie Stapleton MD;  Location: [facility] OR    ENDOSCOPIC RETROGRADE CHOLANGIOPANCREATOGRAM N/A 5/12/2021    Procedure: ENDOSCOPIC RETROGRADE CHOLANGIOPANCREATOGRAPHY WITH STENT REMOVAL AND BILIARY SLUDGE REMOVAL;  Surgeon: Guru Torrie Stapleton MD;  Location: [facility] OR    LAPAROSCOPIC CHOLECYSTECTOMY N/A 3/25/2021    Procedure: 1. Laparoscopic fenestrated subtotal  "cholecystectomy  2. Primary umbilical hernia repair   ;  Surgeon: Lauren William MD;  Location: [facility] OR     omeprazole (PRILOSEC) 20 MG DR capsule  acetaminophen (TYLENOL) 325 MG tablet  amLODIPine-benazepril (LOTREL) 10-40 MG capsule  atorvastatin (LIPITOR) 40 MG tablet  oxyCODONE (ROXICODONE) 5 MG tablet  polyethylene glycol (MIRALAX) 17 GM/Dose powder  senna-docusate (SENOKOT-S/PERICOLACE) 8.6-50 MG tablet      No Known Allergies  Family History  No family history on file.  Social History   Social History     Tobacco Use    Smoking status: Never    Smokeless tobacco: Never   Substance Use Topics    Alcohol use: Never    Drug use: Never      A medically appropriate review of systems was performed with pertinent positives and negatives noted in the HPI, and all other systems negative.    Physical Exam   BP: (!) 148/88  Pulse: 81  Temp: 98.7  F (37.1  C)  Resp: 18  Height: 167.6 cm (5' 6\")  Weight: 127 kg (279 lb 15.8 oz)  SpO2: 98 %  Physical Exam  Constitutional:       General: He is not in acute distress.     Appearance: He is not toxic-appearing.   HENT:      Head: Normocephalic and atraumatic.      Nose: Nose normal.      Mouth/Throat:      Mouth: Mucous membranes are moist.      Pharynx: Oropharynx is clear.   Eyes:      Conjunctiva/sclera: Conjunctivae normal.      Pupils: Pupils are equal, round, and reactive to light.   Cardiovascular:      Rate and Rhythm: Normal rate and regular rhythm.      Heart sounds: No murmur heard.  Pulmonary:      Effort: Pulmonary effort is normal. No respiratory distress.      Breath sounds: No wheezing or rales.   Abdominal:      General: There is no distension.      Palpations: Abdomen is soft.      Tenderness: There is no abdominal tenderness. There is no guarding.   Musculoskeletal:         General: Normal range of motion.   Skin:     General: Skin is warm and dry.   Neurological:      General: No focal deficit present.      Mental Status: He is alert and " oriented to person, place, and time.           ED Course, Procedures, & Data      Procedures            EKG Interpretation:      Interpreted by Chung Casas MD  Time reviewed: 10:30  Symptoms at time of EKG: abdominal pain   Rhythm: normal sinus   Rate: normal  Axis: normal  Ectopy: none  Conduction: RBBB  ST Segments/ T Waves: No ST-T wave changes  Q Waves: none      Clinical Impression: normal EKG            Results for orders placed or performed during the hospital encounter of 06/17/25   CT Abdomen Pelvis w/o Contrast     Status: None    Narrative    EXAMINATION: CT ABDOMEN PELVIS W/O CONTRAST, 6/17/2025 1:28 PM    INDICATION: RUQ pain, history of 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    COMPARISON STUDY: CT AP 3/25/2021.    TECHNIQUE: CT scan of the abdomen and pelvis was performed on  multidetector CT scanner using volumetric acquisition technique and  images were reconstructed in multiple planes with variable thickness  and reviewed on dedicated workstations.     CONTRAST: None.     CT scan radiation dose is optimized to minimum requisite dose using  automated dose modulation techniques.    FINDINGS:  Lower thorax: Respiratory motion artifact limits evaluation of the  lung parenchyma. Normal heart size. Severe coronary artery  calcifications. No pericardial or pleural effusion.    Liver: Steatosis. Simple cyst in segment 4 measuring 1.2 x 1.6 cm  (3/65). No intrahepatic biliary ductal dilation.    Biliary System: Cholecystectomy. Tiny gallbladder remnant in the  gallbladder fossa measuring 2.0 x 1.1 cm (3/75). No extrahepatic  biliary ductal dilation.    Pancreas: No pancreatic ductal dilation. Mild diffuse pancreatic  atrophy.    Adrenal glands: No mass or nodules.    Spleen: Normal.    Kidneys: No obstructing calculus or hydronephrosis. Partial rotation  of the right kidney. Unchanged simple exophytic cyst in the right  kidney measuring 2.5 x 2.5 cm (3/107).    Gastrointestinal tract: Normal  appendix. Normal caliber bowel. Mild  left-sided colonic diverticulosis without acute diverticulitis.    Mesentery/peritoneum/retroperitoneum: No mass. No free fluid or air.    Lymph nodes: No significant lymphadenopathy.    Vasculature: Nonaneurysmal infrarenal abdominal aorta. Mild calcified  plaque in the abdominal aorta and iliac arteries.    Pelvis: Urinary bladder is normal. Prostate and seminal vesicles are  within normal limits. Pelvic phleboliths.    Osseous structures: No aggressive or acute osseous lesion. Mild  degenerative changes of the visualized spine.      Soft tissues: Small to moderate fat-containing umbilical hernia. Small  bilateral fat-containing inguinal hernias, left greater than right.      Impression    IMPRESSION:   1. No acute findings in the abdomen or pelvis.  2. Postoperative changes of cholecystectomy with tiny gallbladder  remnant in the gallbladder fossa.  3. Small to moderate fat-containing umbilical hernia.  4. Hepatic steatosis.    I have personally reviewed the examination and initial interpretation  and I agree with the findings.    DOUGLAS ARNDT MD         SYSTEM ID:  [id number]   Comprehensive metabolic panel     Status: Abnormal   Result Value Ref Range    Sodium 132 (L) 135 - 145 mmol/L    Potassium 4.2 3.4 - 5.3 mmol/L    Carbon Dioxide (CO2) 24 22 - 29 mmol/L    Anion Gap 12 7 - 15 mmol/L    Urea Nitrogen 14.7 8.0 - 23.0 mg/dL    Creatinine 0.74 0.67 - 1.17 mg/dL    GFR Estimate >90 >60 mL/min/1.73m2    Calcium 9.7 8.8 - 10.4 mg/dL    Chloride 96 (L) 98 - 107 mmol/L    Glucose 294 (H) 70 - 99 mg/dL    Alkaline Phosphatase 123 40 - 150 U/L    AST 24 0 - 45 U/L    ALT 17 0 - 70 U/L    Protein Total 8.5 (H) 6.4 - 8.3 g/dL    Albumin 4.3 3.5 - 5.2 g/dL    Bilirubin Total 0.7 <=1.2 mg/dL   Lipase     Status: Normal   Result Value Ref Range    Lipase 36 13 - 60 U/L   Troponin T, High Sensitivity     Status: Normal   Result Value Ref Range    Troponin T, High Sensitivity 12 <=22  ng/L   CBC with platelets and differential     Status: Abnormal   Result Value Ref Range    WBC Count 12.0 (H) 4.0 - 11.0 10e3/uL    RBC Count 4.96 4.40 - 5.90 10e6/uL    Hemoglobin 14.7 13.3 - 17.7 g/dL    Hematocrit 43.7 40.0 - 53.0 %    MCV 88 78 - 100 fL    MCH 29.6 26.5 - 33.0 pg    MCHC 33.6 31.5 - 36.5 g/dL    RDW 12.6 10.0 - 15.0 %    Platelet Count 262 150 - 450 10e3/uL    % Neutrophils 65 %    % Lymphocytes 25 %    % Monocytes 7 %    % Eosinophils 2 %    % Basophils 0 %    % Immature Granulocytes 1 %    NRBCs per 100 WBC 0 <1 /100    Absolute Neutrophils 7.9 1.6 - 8.3 10e3/uL    Absolute Lymphocytes 3.0 0.8 - 5.3 10e3/uL    Absolute Monocytes 0.8 0.0 - 1.3 10e3/uL    Absolute Eosinophils 0.3 0.0 - 0.7 10e3/uL    Absolute Basophils 0.0 0.0 - 0.2 10e3/uL    Absolute Immature Granulocytes 0.1 <=0.4 10e3/uL    Absolute NRBCs 0.0 10e3/uL   Troponin T, High Sensitivity     Status: Normal   Result Value Ref Range    Troponin T, High Sensitivity 12 <=22 ng/L   EKG 12 lead     Status: None   Result Value Ref Range    Systolic Blood Pressure  mmHg    Diastolic Blood Pressure  mmHg    Ventricular Rate 82 BPM    Atrial Rate 82 BPM    FL Interval 178 ms    QRS Duration 150 ms     ms    QTc 507 ms    P Axis 66 degrees    R AXIS 96 degrees    T Axis 49 degrees    Interpretation ECG       Sinus rhythm  Right bundle branch block  Abnormal ECG  Unconfirmed report - interpretation of this ECG is computer generated - see medical record for final interpretation    Confirmed by - EMERGENCY ROOM, PHYSICIAN ([id number]),  MARCE MIGUEL ([id number]) on 6/17/2025 10:44:14 AM     CBC with platelets differential     Status: Abnormal    Narrative    The following orders were created for panel order CBC with platelets differential.  Procedure                               Abnormality         Status                     ---------                               -----------         ------                     CBC with platelets and  ...[[id number]]  Abnormal            Final result                 Please view results for these tests on the individual orders.     Medications   sodium chloride 0.9% BOLUS 500 mL (500 mLs Intravenous Not Given 6/17/25 1054)   acetaminophen (TYLENOL) tablet 1,000 mg (1,000 mg Oral $Given 6/17/25 1017)   famotidine (PEPCID) tablet 20 mg (20 mg Oral $Given 6/17/25 1532)     Labs Ordered and Resulted from Time of ED Arrival to Time of ED Departure   COMPREHENSIVE METABOLIC PANEL - Abnormal       Result Value    Sodium 132 (*)     Potassium 4.2      Carbon Dioxide (CO2) 24      Anion Gap 12      Urea Nitrogen 14.7      Creatinine 0.74      GFR Estimate >90      Calcium 9.7      Chloride 96 (*)     Glucose 294 (*)     Alkaline Phosphatase 123      AST 24      ALT 17      Protein Total 8.5 (*)     Albumin 4.3      Bilirubin Total 0.7     CBC WITH PLATELETS AND DIFFERENTIAL - Abnormal    WBC Count 12.0 (*)     RBC Count 4.96      Hemoglobin 14.7      Hematocrit 43.7      MCV 88      MCH 29.6      MCHC 33.6      RDW 12.6      Platelet Count 262      % Neutrophils 65      % Lymphocytes 25      % Monocytes 7      % Eosinophils 2      % Basophils 0      % Immature Granulocytes 1      NRBCs per 100 WBC 0      Absolute Neutrophils 7.9      Absolute Lymphocytes 3.0      Absolute Monocytes 0.8      Absolute Eosinophils 0.3      Absolute Basophils 0.0      Absolute Immature Granulocytes 0.1      Absolute NRBCs 0.0     LIPASE - Normal    Lipase 36     TROPONIN T, HIGH SENSITIVITY - Normal    Troponin T, High Sensitivity 12     TROPONIN T, HIGH SENSITIVITY - Normal    Troponin T, High Sensitivity 12       CT Abdomen Pelvis w/o Contrast   Final Result   IMPRESSION:    1. No acute findings in the abdomen or pelvis.   2. Postoperative changes of cholecystectomy with tiny gallbladder   remnant in the gallbladder fossa.   3. Small to moderate fat-containing umbilical hernia.   4. Hepatic steatosis.      I have personally reviewed the  examination and initial interpretation   and I agree with the findings.      DOUGLAS ARNDT MD            SYSTEM ID:  [id number]             Critical care was not performed.     Medical Decision Making  The patient's presentation was of moderate complexity (an undiagnosed new problem with uncertain prognosis).    The patient's evaluation involved:  review of external note(s) from 3+ sources (multiple clinic notes, telephone encounters)  review of 3+ test result(s) ordered prior to this encounter (CBC, CMP, A1c)  ordering and/or review of 3+ test(s) in this encounter (see separate area of note for details)    The patient's management necessitated moderate risk (prescription drug management including medications given in the ED).    Assessment & Plan    This is a 59-year-old male with history of cholecystectomy presenting with abdominal pain.  He reports intermittent epigastric and right upper quadrant pain.  Here vitals were reassuring and he was well-appearing, abdominal exam was benign.  Labs were obtained and reassuring, LFTs and bilirubin were normal, CBC did show slight leukocytosis.  Troponin stable.  CT ab pelvis was obtained, negative for any acute findings.  He was reevaluated and reported feeling well.  Results were discussed with the patient.  Gastritis or GERD could be contributing to symptoms.  Will start patient on omeprazole trial.  He is agreeable with this plan.  He will follow-up as an outpatient.  Return cautions were discussed and all questions answered.      I have reviewed the nursing notes. I have reviewed the findings, diagnosis, plan and need for follow up with the patient.    New Prescriptions    OMEPRAZOLE (PRILOSEC) 20 MG DR CAPSULE    Take 1 capsule (20 mg) by mouth 2 times daily for 14 days.       Final diagnoses:   Abdominal pain, unspecified abdominal location         [facility] EMERGENCY DEPARTMENT  6/17/2025     Chung Casas MD  06/17/25 [id number]

## (undated) DEVICE — ENDO TROCAR FIRST ENTRY KII FIOS Z-THRD 12X100MM CTF73

## (undated) DEVICE — DRAPE C-ARM W/STRAPS 42X72" 07-CA104

## (undated) DEVICE — CATH RETRIEVAL BALLOON EXTRACTOR PRO RX-S INJ ABOVE 9-12MM

## (undated) DEVICE — GUIDEWIRE NOVAGOLD .018X480CM STR TIP M00552010

## (undated) DEVICE — LINEN TOWEL PACK X30 5481

## (undated) DEVICE — WIRE GUIDE 0.025"X270CM ANG VISIGLIDE G-240-2527A

## (undated) DEVICE — PACK ENDOSCOPY GI CUSTOM UMMC

## (undated) DEVICE — DRAIN JACKSON PRATT RESERVOIR 100ML SU130-1305

## (undated) DEVICE — LIGHT HANDLE X1 31140133

## (undated) DEVICE — ENDO FUSION OMNI-TOME G31903

## (undated) DEVICE — ENDO DISSECTOR BLUNT 05MM  BTD05

## (undated) DEVICE — SU VICRYL 4-0 PS-2 18" UND J496H

## (undated) DEVICE — BIOPSY VALVE BIOSHIELD 00711135

## (undated) DEVICE — ENDO SNARE POLYPECTOMY OVAL 15MM LOOP SD-240U-15

## (undated) DEVICE — KIT CONNECTOR FOR OLYMPUS ENDOSCOPES DEFENDO 100310

## (undated) DEVICE — SOL NACL 0.9% INJ 1000ML BAG 2B1324X

## (undated) DEVICE — GLOVE PROTEXIS POWDER FREE SMT 6.5  2D72PT65X

## (undated) DEVICE — SU VICRYL 0 UR-6 27" J603H

## (undated) DEVICE — KIT ENDO FIRST STEP DISINFECTANT 200ML W/POUCH EP-4

## (undated) DEVICE — WIRE GUIDE 0.025"X270CM STR VISIGLIDE G-240-2527S

## (undated) DEVICE — TEST TUBE W/SCREW CAP 17361

## (undated) DEVICE — Device

## (undated) DEVICE — ENDO TROCAR SLEEVE KII Z-THREADED 05X100MM CTS02

## (undated) DEVICE — ENDO CATH BALLOON DILATION HURRICANE 04MMX4X180CM M00545900

## (undated) DEVICE — GUIDEWIRE NOVAGOLD .018X260CM STR TIP M00552000

## (undated) DEVICE — ENDO BITE BLOCK ADULT OMNI-BLOC

## (undated) DEVICE — CLIP APPLIER ENDO 5MM M/L LIGAMAX EL5ML

## (undated) DEVICE — TUBING SUCTION 10'X3/16" N510

## (undated) DEVICE — DRSG PRIMAPORE 02X3" 7133

## (undated) DEVICE — CANNULA BILIARY CONTOUR ERCP .018"X210CM 5-4-3 TIP

## (undated) DEVICE — ENDO TUBING CO2 SMARTCAP STERILE DISP 100145CO2EXT

## (undated) DEVICE — ESU PENCIL W/COATED BLADE E2450H

## (undated) DEVICE — ANTIFOG SOLUTION W/FOAM PAD 31142527

## (undated) DEVICE — INTR ENDOSCOPIC STENT FUSION OASIS 09.0FRX200CM

## (undated) DEVICE — ENDO POUCH UNIV RETRIEVAL SYSTEM INZII 10MM CD001

## (undated) DEVICE — ENDO FUSION OMNI-TOME 21 FS-OMNI-21 G48675

## (undated) DEVICE — SUCTION MANIFOLD NEPTUNE 2 SYS 4 PORT 0702-020-000

## (undated) DEVICE — LINEN TOWEL PACK X6 WHITE 5487

## (undated) DEVICE — SOL WATER IRRIG 1000ML BOTTLE 2F7114

## (undated) DEVICE — SU VICRYL 3-0 SH 27" J316H

## (undated) DEVICE — INFLATION DEVICE BIG 60 ENDO-AN6012

## (undated) DEVICE — ESU ENDO SCISSORS 5MM CVD 5DCS

## (undated) DEVICE — ESU GROUND PAD ADULT W/CORD E7507

## (undated) DEVICE — ENDO ADPT CATH ROTATABLE SIDE ARM G22677

## (undated) DEVICE — DRAIN JACKSON PRATT ROUND SIL 19FR W/TROCAR LF JP-2232

## (undated) DEVICE — NDL INSUFFLATION 13GA 120MM C2201

## (undated) DEVICE — ENDO TROCAR FIRST ENTRY KII FIOS Z-THRD 05X100MM CTF03

## (undated) DEVICE — PREP CHLORAPREP 26ML TINTED ORANGE  260815

## (undated) DEVICE — ENDO TROCAR BLUNT TIP KII BALLOON 12X100MM C0R47

## (undated) DEVICE — GLOVE PROTEXIS BLUE W/NEU-THERA 7.0  2D73EB70

## (undated) DEVICE — ENDO EXTRACTOR BALLOON 09-12MM 4690

## (undated) DEVICE — WIRE GUIDE TRACER METRO DIRECT .021"X260CM STR TIP G55705

## (undated) RX ORDER — PROPOFOL 10 MG/ML
INJECTION, EMULSION INTRAVENOUS
Status: DISPENSED
Start: 2021-03-25

## (undated) RX ORDER — DEXAMETHASONE SODIUM PHOSPHATE 4 MG/ML
INJECTION, SOLUTION INTRA-ARTICULAR; INTRALESIONAL; INTRAMUSCULAR; INTRAVENOUS; SOFT TISSUE
Status: DISPENSED
Start: 2021-03-25

## (undated) RX ORDER — FENTANYL CITRATE 50 UG/ML
INJECTION, SOLUTION INTRAMUSCULAR; INTRAVENOUS
Status: DISPENSED
Start: 2021-05-12

## (undated) RX ORDER — FENTANYL CITRATE-0.9 % NACL/PF 10 MCG/ML
PLASTIC BAG, INJECTION (ML) INTRAVENOUS
Status: DISPENSED
Start: 2021-03-25

## (undated) RX ORDER — LIDOCAINE HYDROCHLORIDE 20 MG/ML
INJECTION, SOLUTION EPIDURAL; INFILTRATION; INTRACAUDAL; PERINEURAL
Status: DISPENSED
Start: 2021-03-25

## (undated) RX ORDER — PROPOFOL 10 MG/ML
INJECTION, EMULSION INTRAVENOUS
Status: DISPENSED
Start: 2021-05-12

## (undated) RX ORDER — SODIUM CHLORIDE, SODIUM LACTATE, POTASSIUM CHLORIDE, CALCIUM CHLORIDE 600; 310; 30; 20 MG/100ML; MG/100ML; MG/100ML; MG/100ML
INJECTION, SOLUTION INTRAVENOUS
Status: DISPENSED
Start: 2021-03-25

## (undated) RX ORDER — ONDANSETRON 2 MG/ML
INJECTION INTRAMUSCULAR; INTRAVENOUS
Status: DISPENSED
Start: 2021-03-27

## (undated) RX ORDER — FENTANYL CITRATE 50 UG/ML
INJECTION, SOLUTION INTRAMUSCULAR; INTRAVENOUS
Status: DISPENSED
Start: 2021-03-25

## (undated) RX ORDER — GLYCOPYRROLATE 0.2 MG/ML
INJECTION, SOLUTION INTRAMUSCULAR; INTRAVENOUS
Status: DISPENSED
Start: 2021-03-25

## (undated) RX ORDER — WATER 10 ML/10ML
INJECTION INTRAMUSCULAR; INTRAVENOUS; SUBCUTANEOUS
Status: DISPENSED
Start: 2021-03-27

## (undated) RX ORDER — HYDRALAZINE HYDROCHLORIDE 20 MG/ML
INJECTION INTRAMUSCULAR; INTRAVENOUS
Status: DISPENSED
Start: 2021-03-27

## (undated) RX ORDER — ONDANSETRON 2 MG/ML
INJECTION INTRAMUSCULAR; INTRAVENOUS
Status: DISPENSED
Start: 2021-05-12

## (undated) RX ORDER — EPHEDRINE SULFATE 50 MG/ML
INJECTION, SOLUTION INTRAMUSCULAR; INTRAVENOUS; SUBCUTANEOUS
Status: DISPENSED
Start: 2021-03-27

## (undated) RX ORDER — ONDANSETRON 2 MG/ML
INJECTION INTRAMUSCULAR; INTRAVENOUS
Status: DISPENSED
Start: 2021-03-25

## (undated) RX ORDER — FENTANYL CITRATE-0.9 % NACL/PF 10 MCG/ML
PLASTIC BAG, INJECTION (ML) INTRAVENOUS
Status: DISPENSED
Start: 2021-03-27

## (undated) RX ORDER — FENTANYL CITRATE 50 UG/ML
INJECTION, SOLUTION INTRAMUSCULAR; INTRAVENOUS
Status: DISPENSED
Start: 2021-03-27

## (undated) RX ORDER — EPHEDRINE SULFATE 50 MG/ML
INJECTION, SOLUTION INTRAMUSCULAR; INTRAVENOUS; SUBCUTANEOUS
Status: DISPENSED
Start: 2021-03-25